# Patient Record
Sex: MALE | Race: WHITE | Employment: FULL TIME | URBAN - METROPOLITAN AREA
[De-identification: names, ages, dates, MRNs, and addresses within clinical notes are randomized per-mention and may not be internally consistent; named-entity substitution may affect disease eponyms.]

---

## 2017-02-14 RX ORDER — ATORVASTATIN CALCIUM 80 MG/1
TABLET, FILM COATED ORAL
Qty: 30 TAB | Refills: 4 | Status: SHIPPED | OUTPATIENT
Start: 2017-02-14 | End: 2017-07-16 | Stop reason: SDUPTHER

## 2017-03-06 RX ORDER — METOPROLOL TARTRATE 50 MG/1
50 TABLET ORAL 2 TIMES DAILY
Qty: 120 TAB | Refills: 4 | Status: SHIPPED | OUTPATIENT
Start: 2017-03-06 | End: 2017-07-10 | Stop reason: DRUGHIGH

## 2017-04-17 DIAGNOSIS — I48.19 PERSISTENT ATRIAL FIBRILLATION (HCC): ICD-10-CM

## 2017-07-08 ENCOUNTER — APPOINTMENT (OUTPATIENT)
Dept: GENERAL RADIOLOGY | Age: 72
End: 2017-07-08
Attending: EMERGENCY MEDICINE
Payer: COMMERCIAL

## 2017-07-08 ENCOUNTER — HOSPITAL ENCOUNTER (EMERGENCY)
Age: 72
Discharge: HOME | End: 2017-07-08
Attending: EMERGENCY MEDICINE
Payer: COMMERCIAL

## 2017-07-08 VITALS
TEMPERATURE: 97.6 F | DIASTOLIC BLOOD PRESSURE: 102 MMHG | RESPIRATION RATE: 12 BRPM | OXYGEN SATURATION: 95 % | HEART RATE: 63 BPM | SYSTOLIC BLOOD PRESSURE: 185 MMHG | BODY MASS INDEX: 34.1 KG/M2 | HEIGHT: 76 IN | WEIGHT: 280 LBS

## 2017-07-08 DIAGNOSIS — I10 ESSENTIAL HYPERTENSION: ICD-10-CM

## 2017-07-08 DIAGNOSIS — R07.9 ACUTE CHEST PAIN: Primary | ICD-10-CM

## 2017-07-08 LAB
ANION GAP BLD CALC-SCNC: 7 MMOL/L (ref 5–15)
APTT PPP: 27.8 SEC (ref 22.1–32.5)
BASOPHILS # BLD AUTO: 0.1 K/UL (ref 0–0.1)
BASOPHILS # BLD: 2 % (ref 0–1)
BNP SERPL-MCNC: 912 PG/ML (ref 0–125)
BUN SERPL-MCNC: 14 MG/DL (ref 6–20)
BUN/CREAT SERPL: 15 (ref 12–20)
CALCIUM SERPL-MCNC: 9.6 MG/DL (ref 8.5–10.1)
CHLORIDE SERPL-SCNC: 102 MMOL/L (ref 97–108)
CO2 SERPL-SCNC: 28 MMOL/L (ref 21–32)
CREAT SERPL-MCNC: 0.95 MG/DL (ref 0.7–1.3)
EOSINOPHIL # BLD: 0.3 K/UL (ref 0–0.4)
EOSINOPHIL NFR BLD: 8 % (ref 0–7)
ERYTHROCYTE [DISTWIDTH] IN BLOOD BY AUTOMATED COUNT: 13.4 % (ref 11.5–14.5)
GLUCOSE SERPL-MCNC: 100 MG/DL (ref 65–100)
HCT VFR BLD AUTO: 44.9 % (ref 36.6–50.3)
HGB BLD-MCNC: 15.1 G/DL (ref 12.1–17)
INR PPP: 1.1 (ref 0.9–1.1)
LYMPHOCYTES # BLD AUTO: 31 % (ref 12–49)
LYMPHOCYTES # BLD: 1.3 K/UL (ref 0.8–3.5)
MCH RBC QN AUTO: 31.7 PG (ref 26–34)
MCHC RBC AUTO-ENTMCNC: 33.6 G/DL (ref 30–36.5)
MCV RBC AUTO: 94.1 FL (ref 80–99)
MONOCYTES # BLD: 0.7 K/UL (ref 0–1)
MONOCYTES NFR BLD AUTO: 18 % (ref 5–13)
NEUTS SEG # BLD: 1.6 K/UL (ref 1.8–8)
NEUTS SEG NFR BLD AUTO: 41 % (ref 32–75)
PLATELET # BLD AUTO: 144 K/UL (ref 150–400)
POTASSIUM SERPL-SCNC: 4.6 MMOL/L (ref 3.5–5.1)
PROTHROMBIN TIME: 11.5 SEC (ref 9–11.1)
RBC # BLD AUTO: 4.77 M/UL (ref 4.1–5.7)
SODIUM SERPL-SCNC: 137 MMOL/L (ref 136–145)
THERAPEUTIC RANGE,PTTT: NORMAL SECS (ref 58–77)
TROPONIN I SERPL-MCNC: <0.04 NG/ML
WBC # BLD AUTO: 4 K/UL (ref 4.1–11.1)

## 2017-07-08 PROCEDURE — 85610 PROTHROMBIN TIME: CPT | Performed by: EMERGENCY MEDICINE

## 2017-07-08 PROCEDURE — 85730 THROMBOPLASTIN TIME PARTIAL: CPT | Performed by: EMERGENCY MEDICINE

## 2017-07-08 PROCEDURE — 80048 BASIC METABOLIC PNL TOTAL CA: CPT | Performed by: EMERGENCY MEDICINE

## 2017-07-08 PROCEDURE — 84484 ASSAY OF TROPONIN QUANT: CPT | Performed by: EMERGENCY MEDICINE

## 2017-07-08 PROCEDURE — 36415 COLL VENOUS BLD VENIPUNCTURE: CPT | Performed by: EMERGENCY MEDICINE

## 2017-07-08 PROCEDURE — 93005 ELECTROCARDIOGRAM TRACING: CPT

## 2017-07-08 PROCEDURE — 99285 EMERGENCY DEPT VISIT HI MDM: CPT

## 2017-07-08 PROCEDURE — 71020 XR CHEST PA LAT: CPT

## 2017-07-08 PROCEDURE — 83880 ASSAY OF NATRIURETIC PEPTIDE: CPT | Performed by: EMERGENCY MEDICINE

## 2017-07-08 PROCEDURE — 85025 COMPLETE CBC W/AUTO DIFF WBC: CPT | Performed by: EMERGENCY MEDICINE

## 2017-07-08 RX ORDER — SODIUM CHLORIDE 0.9 % (FLUSH) 0.9 %
5-10 SYRINGE (ML) INJECTION AS NEEDED
Status: DISCONTINUED | OUTPATIENT
Start: 2017-07-08 | End: 2017-07-08 | Stop reason: HOSPADM

## 2017-07-08 RX ORDER — ISOSORBIDE MONONITRATE 30 MG/1
30 TABLET, EXTENDED RELEASE ORAL DAILY
Qty: 20 TAB | Refills: 0 | Status: ON HOLD | OUTPATIENT
Start: 2017-07-08 | End: 2017-07-13

## 2017-07-08 NOTE — ED TRIAGE NOTES
Pt to ER for HTN. Pt reports his Metoprolol was doubled a little over a week ago by Pancho Kasper. Pt also reports WATERS and increasing chest tightness with exertion.

## 2017-07-08 NOTE — ED PROVIDER NOTES
HPI Comments: 73 y.o. male with past medical history significant for MI, CAD, cardiac stent placement, HTN, COPD, A-fib, ETOH abuse, back surgery, obesity who presents with chief complaint of SOB. Patient presents in ED complaining of increasing SOB and associated chest tightness over the past ~1 month. He explains that he becomes \"out of breath\" with physical activity and notes his b/l ankles seem to be swollen. Patient reports hx of similar sx associated with cardiac problem and notes \"this feels like I have a blockage\". He reports hx of cardiac stent placement. Patient also complains of ~3 weeks elevated BP. He explains that systolic has been \"in 147'K\" and diastolic has been \"above 100\". Patient was seen by PCP for this ~9-10 days ago where he had his Metoprolol dosage changed from 50 mg BID to 100 mg BID. He reports taking this medications as directed with no improvement in BP. In ED, patient is in A-fib. He reports hx of intermittent A-fib since MI ~3 years ago but explains that \"I am not always in A-fib\". Patient reports taking Eliquis. He denies any chest pain, fever, cough, nausea, vomiting, numbness, weakness. There are no other acute medical concerns at this time. Social hx: former smoker; quit date: 2/1/2014. +ETOH use; 1 glass of wine per week. PCP: Jarrell Roy MD    Cardiology: Dian Maldonado MD    Note written by Low Machuca. Kirit Morley, as dictated by Claudia Ferrera MD 7:57 AM      The history is provided by the patient. No  was used. Past Medical History:   Diagnosis Date    Alcohol abuse     Atrial fibrillation ([facility] [address] 6/30/2014    Chronic obstructive pulmonary disease (HCC)     Coronary artery disease 4/11/2014    A.  NSTEMI (2/12/14): B. Cath (2/12/14):  LAD m50. OM1 50. RCA m99. EF 55%. C.  PCI mRCA (2/12/14):  3.5x26 Integrity ([facility]).     Depression     Dyslipidemia 4/11/2014    Hypertension 4/11/2014    Obesity        Past Surgical History: Procedure Laterality Date    HX HEART CATHETERIZATION  2/2014    PCI    HX ORTHOPAEDIC      knee    HX ORTHOPAEDIC      back          Family History:   Problem Relation Age of Onset    No Known Problems Mother        Social History     Social History    Marital status:      Spouse name: N/A    Number of children: N/A    Years of education: N/A     Occupational History    Not on file. Social History Main Topics    Smoking status: Former Smoker     Quit date: 2/1/2014    Smokeless tobacco: Current User      Comment: Smokes e cigartettes    Alcohol use 0.6 oz/week     1 Glasses of wine per week    Drug use: No    Sexual activity: Not on file     Other Topics Concern    Not on file     Social History Narrative         ALLERGIES: Review of patient's allergies indicates no known allergies. Review of Systems   Constitutional: Negative. Negative for appetite change, fever and unexpected weight change. HENT: Negative. Negative for ear pain, hearing loss, nosebleeds, rhinorrhea, sore throat and trouble swallowing. Respiratory: Positive for chest tightness and shortness of breath. Negative for cough. Cardiovascular: Positive for leg swelling (b/l ankles). Negative for chest pain and palpitations. Gastrointestinal: Negative. Negative for abdominal distention, abdominal pain, blood in stool and vomiting. Endocrine: Negative. Genitourinary: Negative for dysuria and hematuria. Musculoskeletal: Negative. Negative for back pain and myalgias. Skin: Negative. Negative for rash. Allergic/Immunologic: Negative. Neurological: Negative. Negative for dizziness, syncope, weakness and numbness. Hematological: Negative. Psychiatric/Behavioral: Negative. All other systems reviewed and are negative.       Vitals:    07/08/17 0745 07/08/17 0803 07/08/17 0815 07/08/17 0830   BP: (!) 155/109 (!) 141/108 (!) 142/102 (!) 172/102   Pulse: 61 61 64 61   Resp: 11 16 13 8   Temp: SpO2: 96% 94% 100% 95%   Weight:       Height:                Physical Exam   Constitutional: He is oriented to person, place, and time. He appears well-developed and well-nourished. No distress. HENT:   Head: Normocephalic and atraumatic. Right Ear: External ear normal.   Left Ear: External ear normal.   Nose: Nose normal.   Mouth/Throat: Oropharynx is clear and moist.   Eyes: Conjunctivae and EOM are normal. Pupils are equal, round, and reactive to light. Neck: Normal range of motion. Neck supple. No JVD present. No thyromegaly present. Cardiovascular: Normal rate, normal heart sounds and intact distal pulses. An irregular rhythm present. No murmur heard. Pulmonary/Chest: Effort normal and breath sounds normal. No respiratory distress. He has no wheezes. He has no rales. Abdominal: Soft. Bowel sounds are normal. He exhibits no distension. There is no tenderness. Musculoskeletal: Normal range of motion. He exhibits no edema. Neurological: He is alert and oriented to person, place, and time. No cranial nerve deficit. Skin: Skin is warm and dry. No rash noted. Psychiatric: He has a normal mood and affect. His behavior is normal. Thought content normal.   Note written by Pascual Martínez. Elen Henry, as dictated by Kevin Segal MD 7:57 AM      [facility]  ED Course       Procedures    ED EKG interpretation:  Rhythm: atrial fib; and irregular. Rate (approx.): 62; Axis: normal; ST/T wave: normal; no acute injury pattern. Note written by Pascual Martínez. Elen Henry, as dictated by Kevin Segal MD 7:38 AM    CONSULT NOTE:  Fiona Hope MD spoke with Dr. Megha Mendenhall, Consult for Cardiology. Discussed available diagnostic tests and clinical findings. He is in agreement with care plans as outlined. Dr. Megha Mendenhall will follow up with patient on Monday or Tuesday. PROGRESS NOTE:  9:20 AM  Patient has BNP of 912. CBC unrem. CMP unrem. Troponin neg. Chest x-ray neg.  Discussed with Dr. Megha Mendenhall about accelerated sx over past few weeks. ED work up is negative but patient needs urgent Cardiology f/u. Will start patient on long acting nitrates to control any element of angina. Cardiology will follow up on Monday or Tuesday. Patient encouraged to return if sx worsen.

## 2017-07-08 NOTE — DISCHARGE INSTRUCTIONS
Chest Pain: Care Instructions  Your Care Instructions  There are many things that can cause chest pain. Some are not serious and will get better on their own in a few days. But some kinds of chest pain need more testing and treatment. Your doctor may have recommended a follow-up visit in the next 8 to 12 hours. If you are not getting better, you may need more tests or treatment. Even though your doctor has released you, you still need to watch for any problems. The doctor carefully checked you, but sometimes problems can develop later. If you have new symptoms or if your symptoms do not get better, get medical care right away. If you have worse or different chest pain or pressure that lasts more than 5 minutes or you passed out (lost consciousness), call 911 or seek other emergency help right away. A medical visit is only one step in your treatment. Even if you feel better, you still need to do what your doctor recommends, such as going to all suggested follow-up appointments and taking medicines exactly as directed. This will help you recover and help prevent future problems. How can you care for yourself at home? · Rest until you feel better. · Take your medicine exactly as prescribed. Call your doctor if you think you are having a problem with your medicine. · Do not drive after taking a prescription pain medicine. When should you call for help? Call 911 if:  · You passed out (lost consciousness). · You have severe difficulty breathing. · You have symptoms of a heart attack. These may include:  ¨ Chest pain or pressure, or a strange feeling in your chest.  ¨ Sweating. ¨ Shortness of breath. ¨ Nausea or vomiting. ¨ Pain, pressure, or a strange feeling in your back, neck, jaw, or upper belly or in one or both shoulders or arms. ¨ Lightheadedness or sudden weakness. ¨ A fast or irregular heartbeat.   After you call 911, the  may tell you to chew 1 adult-strength or 2 to 4 low-dose aspirin. Wait for an ambulance. Do not try to drive yourself. Call your doctor today if:  · You have any trouble breathing. · Your chest pain gets worse. · You are dizzy or lightheaded, or you feel like you may faint. · You are not getting better as expected. · You are having new or different chest pain. Where can you learn more? Go to http://jose manuel-devan.info/. Enter A120 in the search box to learn more about \"Chest Pain: Care Instructions. \"  Current as of: March 20, 2017  Content Version: 11.3  © 1418-6987 [employer]. Care instructions adapted under license by [id number] (which disclaims liability or warranty for this information). If you have questions about a medical condition or this instruction, always ask your healthcare professional. [address] any warranty or liability for your use of this information. High Blood Pressure: Care Instructions  Your Care Instructions  If your blood pressure is usually above 140/90, you have high blood pressure, or hypertension. That means the top number is 140 or higher or the bottom number is 90 or higher, or both. Despite what a lot of people think, high blood pressure usually doesn't cause headaches or make you feel dizzy or lightheaded. It usually has no symptoms. But it does increase your risk for heart attack, stroke, and kidney or eye damage. The higher your blood pressure, the more your risk increases. Your doctor will give you a goal for your blood pressure. Your goal will be based on your health and your age. An example of a goal is to keep your blood pressure below 140/90. Lifestyle changes, such as eating healthy and being active, are always important to help lower blood pressure. You might also take medicine to reach your blood pressure goal.  Follow-up care is a key part of your treatment and safety.  Be sure to make and go to all appointments, and call your doctor if you are having problems. It's also a good idea to know your test results and keep a list of the medicines you take. How can you care for yourself at home? Medical treatment  · If you stop taking your medicine, your blood pressure will go back up. You may take one or more types of medicine to lower your blood pressure. Be safe with medicines. Take your medicine exactly as prescribed. Call your doctor if you think you are having a problem with your medicine. · Talk to your doctor before you start taking aspirin every day. Aspirin can help certain people lower their risk of a heart attack or stroke. But taking aspirin isn't right for everyone, because it can cause serious bleeding. · See your doctor regularly. You may need to see the doctor more often at first or until your blood pressure comes down. · If you are taking blood pressure medicine, talk to your doctor before you take decongestants or anti-inflammatory medicine, such as ibuprofen. Some of these medicines can raise blood pressure. · Learn how to check your blood pressure at home. Lifestyle changes  · Stay at a healthy weight. This is especially important if you put on weight around the waist. Losing even 10 pounds can help you lower your blood pressure. · If your doctor recommends it, get more exercise. Walking is a good choice. Bit by bit, increase the amount you walk every day. Try for at least 30 minutes on most days of the week. You also may want to swim, bike, or do other activities. · Avoid or limit alcohol. Talk to your doctor about whether you can drink any alcohol. · Try to limit how much sodium you eat to less than 2,300 milligrams (mg) a day. Your doctor may ask you to try to eat less than 1,500 mg a day. · Eat plenty of fruits (such as bananas and oranges), vegetables, legumes, whole grains, and low-fat dairy products. · Lower the amount of saturated fat in your diet. Saturated fat is found in animal products such as milk, cheese, and meat. Limiting these foods may help you lose weight and also lower your risk for heart disease. · Do not smoke. Smoking increases your risk for heart attack and stroke. If you need help quitting, talk to your doctor about stop-smoking programs and medicines. These can increase your chances of quitting for good. When should you call for help? Call 911 anytime you think you may need emergency care. This may mean having symptoms that suggest that your blood pressure is causing a serious heart or blood vessel problem. Your blood pressure may be over 180/110. For example, call 911 if:  · You have symptoms of a heart attack. These may include:  ¨ Chest pain or pressure, or a strange feeling in the chest.  ¨ Sweating. ¨ Shortness of breath. ¨ Nausea or vomiting. ¨ Pain, pressure, or a strange feeling in the back, neck, jaw, or upper belly or in one or both shoulders or arms. ¨ Lightheadedness or sudden weakness. ¨ A fast or irregular heartbeat. · You have symptoms of a stroke. These may include:  ¨ Sudden numbness, tingling, weakness, or loss of movement in your face, arm, or leg, especially on only one side of your body. ¨ Sudden vision changes. ¨ Sudden trouble speaking. ¨ Sudden confusion or trouble understanding simple statements. ¨ Sudden problems with walking or balance. ¨ A sudden, severe headache that is different from past headaches. · You have severe back or belly pain. Do not wait until your blood pressure comes down on its own. Get help right away. Call your doctor now or seek immediate care if:  · Your blood pressure is much higher than normal (such as 180/110 or higher), but you don't have symptoms. · You think high blood pressure is causing symptoms, such as:  ¨ Severe headache. ¨ Blurry vision. Watch closely for changes in your health, and be sure to contact your doctor if:  · Your blood pressure measures 140/90 or higher at least 2 times.  That means the top number is 140 or higher or the bottom number is 90 or higher, or both. · You think you may be having side effects from your blood pressure medicine. · Your blood pressure is usually normal, but it goes above normal at least 2 times. Where can you learn more? Go to http://jose manuel-devan.info/. Enter S977 in the search box to learn more about \"High Blood Pressure: Care Instructions. \"  Current as of: August 8, 2016  Content Version: 11.3  © 0431-5173 [employer]. Care instructions adapted under license by [employer] (which disclaims liability or warranty for this information). If you have questions about a medical condition or this instruction, always ask your healthcare professional. [address] any warranty or liability for your use of this information. We hope that we have addressed all of your medical concerns. The examination and treatment you received in the Emergency Department were for an emergent problem and were not intended as complete care. It is important that you follow up with your healthcare provider(s) for ongoing care. If your symptoms worsen or do not improve as expected, and you are unable to reach your usual health care provider(s), you should return to the Emergency Department. Today's healthcare is undergoing tremendous change, and patient satisfaction surveys are one of the many tools to assess the quality of medical care. You may receive a survey from the [employer] organization regarding your experience in the Emergency Department. I hope that your experience has been completely positive, particularly the medical care that I provided. As such, please participate in the survey; anything less than excellent does not meet my expectations or intentions. [facility] and [facility] participate in nationally recognized quality of care measures.   If your blood pressure is greater than 120/80, as reported below, we urge that you seek medical care to address the potential of high blood pressure, commonly known as hypertension. Hypertension can be hereditary or can be caused by certain medical conditions, pain, stress, or \"white coat syndrome. \"       Please make an appointment with your health care provider(s) for follow up of your Emergency Department visit. VITALS:   Patient Vitals for the past 8 hrs:   Temp Pulse Resp BP SpO2   07/08/17 0830 - 61 8 (!) 172/102 95 %   07/08/17 0815 - 64 13 (!) 142/102 100 %   07/08/17 0803 - 61 16 (!) 141/108 94 %   07/08/17 0745 - 61 11 (!) 155/109 96 %   07/08/17 0732 97.6 °F (36.4 °C) 76 18 (!) 181/129 97 %          Thank you for allowing us to provide you with medical care today. We realize that you have many choices for your emergency care needs. Please choose us in the future for any continued health care needs. Regards,           Alistair East, [address] [place] [address].   Office: [phone]            Recent Results (from the past 24 hour(s))   CBC WITH AUTOMATED DIFF    Collection Time: 07/08/17  7:49 AM   Result Value Ref Range    WBC 4.0 (L) 4.1 - 11.1 K/uL    RBC 4.77 4.10 - 5.70 M/uL    HGB 15.1 12.1 - 17.0 g/dL    HCT 44.9 36.6 - 50.3 %    MCV 94.1 80.0 - 99.0 FL    MCH 31.7 26.0 - 34.0 PG    MCHC 33.6 30.0 - 36.5 g/dL    RDW 13.4 11.5 - 14.5 %    PLATELET 688 (L) 339 - 400 K/uL    NEUTROPHILS 41 32 - 75 %    LYMPHOCYTES 31 12 - 49 %    MONOCYTES 18 (H) 5 - 13 %    EOSINOPHILS 8 (H) 0 - 7 %    BASOPHILS 2 (H) 0 - 1 %    ABS. NEUTROPHILS 1.6 (L) 1.8 - 8.0 K/UL    ABS. LYMPHOCYTES 1.3 0.8 - 3.5 K/UL    ABS. MONOCYTES 0.7 0.0 - 1.0 K/UL    ABS. EOSINOPHILS 0.3 0.0 - 0.4 K/UL    ABS.  BASOPHILS 0.1 0.0 - 0.1 K/UL   METABOLIC PANEL, BASIC    Collection Time: 07/08/17  7:49 AM   Result Value Ref Range    Sodium 137 136 - 145 mmol/L    Potassium 4.6 3.5 - 5.1 mmol/L    Chloride 102 97 - 108 mmol/L    CO2 28 21 - 32 mmol/L    Anion gap 7 5 - 15 mmol/L    Glucose 100 65 - 100 mg/dL    BUN 14 6 - 20 MG/DL    Creatinine 0.95 0.70 - 1.30 MG/DL    BUN/Creatinine ratio 15 12 - 20      GFR est AA >60 >60 ml/min/1.73m2    GFR est non-AA >60 >60 ml/min/1.73m2    Calcium 9.6 8.5 - 10.1 MG/DL   TROPONIN I    Collection Time: 07/08/17  7:49 AM   Result Value Ref Range    Troponin-I, Qt. <0.04 <0.05 ng/mL   PRO-BNP    Collection Time: 07/08/17  7:52 AM   Result Value Ref Range    NT pro- (H) 0 - 125 PG/ML   PROTHROMBIN TIME + INR    Collection Time: 07/08/17  8:20 AM   Result Value Ref Range    INR 1.1 0.9 - 1.1      Prothrombin time 11.5 (H) 9.0 - 11.1 sec   PTT    Collection Time: 07/08/17  8:20 AM   Result Value Ref Range    aPTT 27.8 22.1 - 32.5 sec    aPTT, therapeutic range     58.0 - 77.0 SECS       Xr Chest Pa Lat    Result Date: 7/8/2017  CLINICAL HISTORY: Chest pain INDICATION: Chest pain COMPARISON: 9/17/2015 FINDINGS: PA and lateral views of the chest are obtained. The cardiopericardial silhouette is within normal limits. There is no pleural effusion, pneumothorax or focal consolidation present. Patient is on a cardiac monitor. IMPRESSION: No acute intrathoracic disease.

## 2017-07-09 DIAGNOSIS — I48.19 PERSISTENT ATRIAL FIBRILLATION (HCC): ICD-10-CM

## 2017-07-09 RX ORDER — APIXABAN 5 MG/1
TABLET, FILM COATED ORAL
Qty: 60 TAB | Refills: 2 | Status: SHIPPED | OUTPATIENT
Start: 2017-07-09 | End: 2017-09-30 | Stop reason: SDUPTHER

## 2017-07-10 ENCOUNTER — CLINICAL SUPPORT (OUTPATIENT)
Dept: CARDIOLOGY CLINIC | Age: 72
End: 2017-07-10

## 2017-07-10 VITALS
DIASTOLIC BLOOD PRESSURE: 100 MMHG | HEART RATE: 60 BPM | SYSTOLIC BLOOD PRESSURE: 150 MMHG | HEIGHT: 75 IN | OXYGEN SATURATION: 97 % | WEIGHT: 280 LBS | BODY MASS INDEX: 34.82 KG/M2

## 2017-07-10 DIAGNOSIS — I10 ESSENTIAL HYPERTENSION: Primary | ICD-10-CM

## 2017-07-10 LAB
ATRIAL RATE: 66 BPM
CALCULATED R AXIS, ECG10: 10 DEGREES
CALCULATED T AXIS, ECG11: 44 DEGREES
DIAGNOSIS, 93000: NORMAL
Q-T INTERVAL, ECG07: 410 MS
QRS DURATION, ECG06: 92 MS
QTC CALCULATION (BEZET), ECG08: 416 MS
VENTRICULAR RATE, ECG03: 62 BPM

## 2017-07-10 RX ORDER — METOPROLOL TARTRATE 50 MG/1
50 TABLET ORAL 4 TIMES DAILY
COMMUNITY
End: 2017-07-11 | Stop reason: DRUGHIGH

## 2017-07-10 NOTE — PROGRESS NOTES
Pt seen at pcp and sent to er on Saturday due to elevated bp Per pt-bp has continued to be elevated. Increase in metoprolol to 50 mg 4xs daily on Friday. Pt states the increase has not really helped. Pt also with C/o sob,chest tightness,some fatigue. History of Afib Pt states\"I think I have a blockage \" Advised of upcoming apt at 12:40pm tomorrow. Advised pt that if symptoms get worse or ant other concerns to go to the er. Continue with increased metoprolol until seen tomorrow. Pt given letter for work advising appointment tomorrow.

## 2017-07-11 ENCOUNTER — OFFICE VISIT (OUTPATIENT)
Dept: CARDIOLOGY CLINIC | Age: 72
End: 2017-07-11

## 2017-07-11 ENCOUNTER — DOCUMENTATION ONLY (OUTPATIENT)
Dept: CARDIOLOGY CLINIC | Age: 72
End: 2017-07-11

## 2017-07-11 VITALS
OXYGEN SATURATION: 98 % | DIASTOLIC BLOOD PRESSURE: 98 MMHG | RESPIRATION RATE: 22 BRPM | SYSTOLIC BLOOD PRESSURE: 150 MMHG | HEART RATE: 66 BPM | BODY MASS INDEX: 34.82 KG/M2 | WEIGHT: 280 LBS | HEIGHT: 75 IN

## 2017-07-11 DIAGNOSIS — I10 ESSENTIAL HYPERTENSION: ICD-10-CM

## 2017-07-11 DIAGNOSIS — I48.19 PERSISTENT ATRIAL FIBRILLATION (HCC): ICD-10-CM

## 2017-07-11 DIAGNOSIS — I25.83 CORONARY ARTERY DISEASE DUE TO LIPID RICH PLAQUE: Primary | ICD-10-CM

## 2017-07-11 DIAGNOSIS — E78.5 DYSLIPIDEMIA: ICD-10-CM

## 2017-07-11 DIAGNOSIS — I25.10 CORONARY ARTERY DISEASE DUE TO LIPID RICH PLAQUE: Primary | ICD-10-CM

## 2017-07-11 RX ORDER — AMLODIPINE BESYLATE 5 MG/1
5 TABLET ORAL DAILY
Qty: 30 TAB | Refills: 5 | Status: ON HOLD | OUTPATIENT
Start: 2017-07-11 | End: 2017-07-13

## 2017-07-11 RX ORDER — AMLODIPINE BESYLATE 5 MG/1
5 TABLET ORAL DAILY
COMMUNITY
End: 2017-07-11 | Stop reason: SDUPTHER

## 2017-07-11 RX ORDER — METOPROLOL TARTRATE 100 MG/1
100 TABLET ORAL 2 TIMES DAILY
Qty: 60 TAB | Refills: 3 | Status: SHIPPED | OUTPATIENT
Start: 2017-07-11 | End: 2017-10-28 | Stop reason: SDUPTHER

## 2017-07-11 RX ORDER — METOPROLOL TARTRATE 100 MG/1
TABLET ORAL 2 TIMES DAILY
COMMUNITY
End: 2017-07-11 | Stop reason: SDUPTHER

## 2017-07-11 NOTE — MR AVS SNAPSHOT
Visit Information Date & Time Provider Department Dept. Phone Encounter #  
 7/11/2017 12:40 PM Denis Brush MD [facility] Althea Kurtz [phone] [id number] Follow-up Instructions Return for arrange. Upcoming Health Maintenance Date Due Hepatitis C Screening 1945 FOBT Q 1 YEAR AGE 50-75 12/7/1997 ZOSTER VACCINE AGE 60> 12/7/2007 GLAUCOMA SCREENING Q2Y 12/7/2012 Pneumococcal 65+ Low/Medium Risk (1 of 2 - PCV13) 12/7/2012 MEDICARE YEARLY EXAM 12/7/2012 INFLUENZA AGE 9 TO ADULT 8/1/2017 DTaP/Tdap/Td series (2 - Td) 5/12/2025 Allergies as of 7/11/2017  Review Complete On: 7/11/2017 By: Denis Brush MD  
 No Known Allergies Current Immunizations  Reviewed on 1/22/2015 Name Date Tdap 5/12/2015  9:58 PM  
  
 Not reviewed this visit You Were Diagnosed With   
  
 Codes Comments Coronary artery disease due to lipid rich plaque    -  Primary ICD-10-CM: I25.10, I25.83 ICD-9-CM: 414.00, 414.3 Persistent atrial fibrillation (HCC)     ICD-10-CM: I48.1 ICD-9-CM: 427.31 Essential hypertension     ICD-10-CM: I10 
ICD-9-CM: 401.9 Dyslipidemia     ICD-10-CM: E78.5 ICD-9-CM: 272.4 Vitals BP Pulse Resp Height(growth percentile) Weight(growth percentile) SpO2  
 (!) 150/98 (BP 1 Location: Left arm, BP Patient Position: Sitting) 66 22 6' 3\" (1.905 m) 280 lb (127 kg) 98% BMI Smoking Status 28 kg/m2 Former Smoker Vitals History BMI and BSA Data Body Mass Index Body Surface Area 35 kg/m 2 2.59 m 2 [facility]erred [facility] [address] [phone] Your Updated Medication List  
  
   
This list is accurate as of: 7/11/17 12:55 PM.  Always use your most recent med list. amLODIPine 5 mg tablet Commonly known as:  OceanTailer Bars Take 1 Tab by mouth daily. aspirin 81 mg chewable tablet Take 1 Tab by mouth daily. atorvastatin 80 mg tablet Commonly known as:  LIPITOR  
take 1 tablet by mouth once daily  
  
 citalopram 20 mg tablet Commonly known as:  Lawrence Hopes Take 40 mg by mouth daily. ELIQUIS 5 mg tablet Generic drug:  apixaban  
take 1 tablet by mouth twice a day FISH -1,000 mg capsule Generic drug:  fish oil-omega-3 fatty acids Take 1 Cap by mouth every evening. isosorbide mononitrate ER 30 mg tablet Commonly known as:  IMDUR Take 1 Tab by mouth daily. metoprolol tartrate 100 mg IR tablet Commonly known as:  LOPRESSOR Take 1 Tab by mouth two (2) times a day. miSOPROStol 200 mcg tablet Commonly known as:  CYTOTEC Take 1 Tab by mouth every six (6) hours. Indications: PREVENTION OF NSAID-INDUCED GASTRIC ULCER  
  
 thiamine 100 mg tablet Commonly known as:  B-1 Take 100 mg by mouth daily. We Performed the Following CBC W/O DIFF [88402 CPT(R)] METABOLIC PANEL, BASIC [40654 CPT(R)] Follow-up Instructions Return for arrange. Patient Instructions Dibspace Activation Thank you for requesting access to Dibspace. Please follow the instructions below to securely access and download your online medical record. Dibspace allows you to send messages to your doctor, view your test results, renew your prescriptions, schedule appointments, and more. How Do I Sign Up? 1. In your internet browser, go to [email or web address] 
2. Click on the First Time User? Click Here link in the Sign In box. You will be redirect to the New Member Sign Up page. 3. Enter your Dibspace Access Code exactly as it appears below. You will not need to use this code after youve completed the sign-up process. If you do not sign up before the expiration date, you must request a new code.  
 
Dibspace Access Code: [email or web address] 
 Expires: 10/6/2017  9:54 AM (This is the date your SeaDragon Software access code will ) 4. Enter the last four digits of your Social Security Number (xxxx) and Date of Birth (mm/dd/yyyy) as indicated and click Submit. You will be taken to the next sign-up page. 5. Create a SeaDragon Software ID. This will be your SeaDragon Software login ID and cannot be changed, so think of one that is secure and easy to remember. 6. Create a SeaDragon Software password. You can change your password at any time. 7. Enter your Password Reset Question and Answer. This can be used at a later time if you forget your password. 8. Enter your e-mail address. You will receive e-mail notification when new information is available in [address] 9. Click Sign Up. You can now view and download portions of your medical record. 10. Click the Download Summary menu link to download a portable copy of your medical information. Additional Information If you have questions, please visit the Frequently Asked Questions section of the SeaDragon Software website at [email or web address] Remember, SeaDragon Software is NOT to be used for urgent needs. For medical emergencies, dial 911. Atrial Fibrillation: Care Instructions Your Care Instructions Atrial fibrillation is an irregular and often fast heartbeat. Treating this condition is important for several reasons. It can cause blood clots, which can travel from your heart to your brain and cause a stroke. If you have a fast heartbeat, you may feel lightheaded, dizzy, and weak. An irregular heartbeat can also increase your risk for heart failure. Atrial fibrillation is often the result of another heart condition, such as high blood pressure or coronary artery disease. Making changes to improve your heart condition will help you stay healthy and active. Follow-up care is a key part of your treatment and safety.  Be sure to make and go to all appointments, and call your doctor if you are having problems. It's also a good idea to know your test results and keep a list of the medicines you take. How can you care for yourself at home? Medicines · Take your medicines exactly as prescribed. Call your doctor if you think you are having a problem with your medicine. You will get more details on the specific medicines your doctor prescribes. · If your doctor has given you a blood thinner to prevent a stroke, be sure you get instructions about how to take your medicine safely. Blood thinners can cause serious bleeding problems. · Do not take any vitamins, over-the-counter drugs, or herbal products without talking to your doctor first. 
Lifestyle changes · Do not smoke. Smoking can increase your chance of a stroke and heart attack. If you need help quitting, talk to your doctor about stop-smoking programs and medicines. These can increase your chances of quitting for good. · Eat a heart-healthy diet. · Stay at a healthy weight. Lose weight if you need to. · Limit alcohol to 2 drinks a day for men and 1 drink a day for women. Too much alcohol can cause health problems. · Avoid colds and flu. Get a pneumococcal vaccine shot. If you have had one before, ask your doctor whether you need another dose. Get a flu shot every year. If you must be around people with colds or flu, wash your hands often. Activity · If your doctor recommends it, get more exercise. Walking is a good choice. Bit by bit, increase the amount you walk every day. Try for at least 30 minutes on most days of the week. You also may want to swim, bike, or do other activities. Your doctor may suggest that you join a cardiac rehabilitation program so that you can have help increasing your physical activity safely. · Start light exercise if your doctor says it is okay. Even a small amount will help you get stronger, have more energy, and manage stress. Walking is an easy way to get exercise.  Start out by walking a little more than you did in the hospital. Gradually increase the amount you walk. · When you exercise, watch for signs that your heart is working too hard. You are pushing too hard if you cannot talk while you are exercising. If you become short of breath or dizzy or have chest pain, sit down and rest immediately. · Check your pulse regularly. Place two fingers on the artery at the palm side of your wrist, in line with your thumb. If your heartbeat seems uneven or fast, talk to your doctor. When should you call for help? Call 911 anytime you think you may need emergency care. For example, call if: 
· You have symptoms of a heart attack. These may include: ¨ Chest pain or pressure, or a strange feeling in the chest. 
¨ Sweating. ¨ Shortness of breath. ¨ Nausea or vomiting. ¨ Pain, pressure, or a strange feeling in the back, neck, jaw, or upper belly or in one or both shoulders or arms. ¨ Lightheadedness or sudden weakness. ¨ A fast or irregular heartbeat. After you call 911, the  may tell you to chew 1 adult-strength or 2 to 4 low-dose aspirin. Wait for an ambulance. Do not try to drive yourself. · You have symptoms of a stroke. These may include: 
¨ Sudden numbness, tingling, weakness, or loss of movement in your face, arm, or leg, especially on only one side of your body. ¨ Sudden vision changes. ¨ Sudden trouble speaking. ¨ Sudden confusion or trouble understanding simple statements. ¨ Sudden problems with walking or balance. ¨ A sudden, severe headache that is different from past headaches. · You passed out (lost consciousness). Call your doctor now or seek immediate medical care if: 
· You have new or increased shortness of breath. · You feel dizzy or lightheaded, or you feel like you may faint. · Your heart rate becomes irregular. · You can feel your heart flutter in your chest or skip heartbeats. Tell your doctor if these symptoms are new or worse. Watch closely for changes in your health, and be sure to contact your doctor if you have any problems. Where can you learn more? Go to http://jose manuel-devan.info/. Enter U020 in the search box to learn more about \"Atrial Fibrillation: Care Instructions. \" Current as of: September 21, 2016 Content Version: 11.3 © 0442-6312 [employer]. Care instructions adapted under license by [employer] (which disclaims liability or warranty for this information). If you have questions about a medical condition or this instruction, always ask your healthcare professional. [address] any warranty or liability for your use of this information. Introducing [facility] [place] [facility]! [facility] introduces BlackArrow patient portal. Now you can access parts of your medical record, email your doctor's office, and request medication refills online. 1. In your internet browser, go to [email or web address] 2. Click on the First Time User? Click Here link in the Sign In box. You will see the New Member Sign Up page. 3. Enter your BlackArrow Access Code exactly as it appears below. You will not need to use this code after youve completed the sign-up process. If you do not sign up before the expiration date, you must request a new code. · BlackArrow Access Code: [email or web address] Expires: 10/6/2017  9:54 AM 
 
4. Enter the last four digits of your Social Security Number (xxxx) and Date of Birth (mm/dd/yyyy) as indicated and click Submit. You will be taken to the next sign-up page. 5. Create a [employer] Groupt ID. This will be your BlackArrow login ID and cannot be changed, so think of one that is secure and easy to remember. 6. Create a BlackArrow password. You can change your password at any time. 7. Enter your Password Reset Question and Answer. This can be used at a later time if you forget your password. 8. Enter your e-mail address. You will receive e-mail notification when new information is available in [address] 9. Click Sign Up. You can now view and download portions of your medical record. 10. Click the Download Summary menu link to download a portable copy of your medical information. If you have questions, please visit the Frequently Asked Questions section of the Ocean Aero website. Remember, Ocean Aero is NOT to be used for urgent needs. For medical emergencies, dial 911. Now available from your iPhone and Android! Please provide this summary of care documentation to your next provider. Your primary care clinician is listed as Mazin Carlos. If you have any questions after today's visit, please call [phone].

## 2017-07-11 NOTE — PATIENT INSTRUCTIONS
Salsify Activation    Thank you for requesting access to Salsify. Please follow the instructions below to securely access and download your online medical record. Salsify allows you to send messages to your doctor, view your test results, renew your prescriptions, schedule appointments, and more. How Do I Sign Up? 1. In your internet browser, go to [email or web address]  2. Click on the First Time User? Click Here link in the Sign In box. You will be redirect to the New Member Sign Up page. 3. Enter your Salsify Access Code exactly as it appears below. You will not need to use this code after youve completed the sign-up process. If you do not sign up before the expiration date, you must request a new code. Salsify Access Code: Aristeo Milian  Expires: 10/6/2017  9:54 AM (This is the date your Salsify access code will )    4. Enter the last four digits of your Social Security Number (xxxx) and Date of Birth (mm/dd/yyyy) as indicated and click Submit. You will be taken to the next sign-up page. 5. Create a Salsify ID. This will be your Salsify login ID and cannot be changed, so think of one that is secure and easy to remember. 6. Create a Salsify password. You can change your password at any time. 7. Enter your Password Reset Question and Answer. This can be used at a later time if you forget your password. 8. Enter your e-mail address. You will receive e-mail notification when new information is available in [address] 9. Click Sign Up. You can now view and download portions of your medical record. 10. Click the Download Summary menu link to download a portable copy of your medical information. Additional Information    If you have questions, please visit the Frequently Asked Questions section of the Salsify website at [email or web address] Remember, Salsify is NOT to be used for urgent needs. For medical emergencies, dial 911.            Atrial Fibrillation: Care Instructions  Your Care Instructions    Atrial fibrillation is an irregular and often fast heartbeat. Treating this condition is important for several reasons. It can cause blood clots, which can travel from your heart to your brain and cause a stroke. If you have a fast heartbeat, you may feel lightheaded, dizzy, and weak. An irregular heartbeat can also increase your risk for heart failure. Atrial fibrillation is often the result of another heart condition, such as high blood pressure or coronary artery disease. Making changes to improve your heart condition will help you stay healthy and active. Follow-up care is a key part of your treatment and safety. Be sure to make and go to all appointments, and call your doctor if you are having problems. It's also a good idea to know your test results and keep a list of the medicines you take. How can you care for yourself at home? Medicines  · Take your medicines exactly as prescribed. Call your doctor if you think you are having a problem with your medicine. You will get more details on the specific medicines your doctor prescribes. · If your doctor has given you a blood thinner to prevent a stroke, be sure you get instructions about how to take your medicine safely. Blood thinners can cause serious bleeding problems. · Do not take any vitamins, over-the-counter drugs, or herbal products without talking to your doctor first.  Lifestyle changes  · Do not smoke. Smoking can increase your chance of a stroke and heart attack. If you need help quitting, talk to your doctor about stop-smoking programs and medicines. These can increase your chances of quitting for good. · Eat a heart-healthy diet. · Stay at a healthy weight. Lose weight if you need to. · Limit alcohol to 2 drinks a day for men and 1 drink a day for women. Too much alcohol can cause health problems. · Avoid colds and flu. Get a pneumococcal vaccine shot.  If you have had one before, ask your doctor whether you need another dose. Get a flu shot every year. If you must be around people with colds or flu, wash your hands often. Activity  · If your doctor recommends it, get more exercise. Walking is a good choice. Bit by bit, increase the amount you walk every day. Try for at least 30 minutes on most days of the week. You also may want to swim, bike, or do other activities. Your doctor may suggest that you join a cardiac rehabilitation program so that you can have help increasing your physical activity safely. · Start light exercise if your doctor says it is okay. Even a small amount will help you get stronger, have more energy, and manage stress. Walking is an easy way to get exercise. Start out by walking a little more than you did in the hospital. Gradually increase the amount you walk. · When you exercise, watch for signs that your heart is working too hard. You are pushing too hard if you cannot talk while you are exercising. If you become short of breath or dizzy or have chest pain, sit down and rest immediately. · Check your pulse regularly. Place two fingers on the artery at the palm side of your wrist, in line with your thumb. If your heartbeat seems uneven or fast, talk to your doctor. When should you call for help? Call 911 anytime you think you may need emergency care. For example, call if:  · You have symptoms of a heart attack. These may include:  ¨ Chest pain or pressure, or a strange feeling in the chest.  ¨ Sweating. ¨ Shortness of breath. ¨ Nausea or vomiting. ¨ Pain, pressure, or a strange feeling in the back, neck, jaw, or upper belly or in one or both shoulders or arms. ¨ Lightheadedness or sudden weakness. ¨ A fast or irregular heartbeat. After you call 911, the  may tell you to chew 1 adult-strength or 2 to 4 low-dose aspirin. Wait for an ambulance. Do not try to drive yourself. · You have symptoms of a stroke.  These may include:  ¨ Sudden numbness, tingling, weakness, or loss of movement in your face, arm, or leg, especially on only one side of your body. ¨ Sudden vision changes. ¨ Sudden trouble speaking. ¨ Sudden confusion or trouble understanding simple statements. ¨ Sudden problems with walking or balance. ¨ A sudden, severe headache that is different from past headaches. · You passed out (lost consciousness). Call your doctor now or seek immediate medical care if:  · You have new or increased shortness of breath. · You feel dizzy or lightheaded, or you feel like you may faint. · Your heart rate becomes irregular. · You can feel your heart flutter in your chest or skip heartbeats. Tell your doctor if these symptoms are new or worse. Watch closely for changes in your health, and be sure to contact your doctor if you have any problems. Where can you learn more? Go to http://jose manuel-devan.info/. Enter U020 in the search box to learn more about \"Atrial Fibrillation: Care Instructions. \"  Current as of: September 21, 2016  Content Version: 11.3  © 2434-7264 [employer]. Care instructions adapted under license by [employer] (which disclaims liability or warranty for this information). If you have questions about a medical condition or this instruction, always ask your healthcare professional. [address] any warranty or liability for your use of this information.

## 2017-07-11 NOTE — PROGRESS NOTES
Cath with  on Thurs July 13 10 am.Arrive 2nd floor main hospital building at 8am.Npo after midnight. Hold Eliquis starting Tuesday July 11. Labs pending. May take other meds with sips. Pt advised.

## 2017-07-11 NOTE — PROGRESS NOTES
Subjective:     Problem List  Date Reviewed: 7/11/2017          Codes Class Noted    Chronic obstructive pulmonary disease ([facility] ICD-10-CM: J44.9  ICD-9-CM: 532  Unknown        Atrial fibrillation ([facility] ICD-10-CM: I48.91  ICD-9-CM: 427.31  6/30/2014    Overview Addendum 4/4/2015 12:47 PM by MD KEYON White.  DCCV (10/21/14): 250J. Sonia Kim (12/2014). C.  DCCV (4/3/15): On amio. Coronary artery disease ICD-10-CM: I25.10  ICD-9-CM: 414.00  4/11/2014    Overview Addendum 1/21/2015  1:52 PM by MD KEYON White.  NSTEMI (2/12/14):  B. Cath (2/12/14):  LAD m50. OM1 50. RCA m99. EF 55%. C.  PCI mRCA (2/12/14, [facility]):  3.5x26 Integrity (BMS). Familia Fender Cardiolite (6/26/14): Normal perfusion, EF 59%. E.  Echo (6/30/14):  EF 65%. Mildly dil LA. Mild AR/MR. Mild to mod AVSC. A.  Cath (1/21/15):  LAD m70; D1 ost60 (small vessel). OM1 40. RCA patent with patent prox stent. EF 60%. No AVG/MR.  B.  PCI mLAD (1/21/15):  2.75x14 Integrity (BMS). Hypertension ICD-10-CM: I10  ICD-9-CM: 401.9  4/11/2014        Dyslipidemia ICD-10-CM: E78.5  ICD-9-CM: 272.4  4/11/2014    Overview Signed 1/22/2015 12:08 PM by MD KEYON White. FLP (1/22/15): Tot 167, TG 98, HDL 9,  (no RX). Mr Shahram Mayer is a 73 y.o. man with the above past medical history, who presents for follow up of his coronary artery disease. He was in the emergency room recently where he reported elevated blood pressure, as well as some shortness of breath with activity and some chest discomfort. Over the past several weeks he has noticed these symptoms. He has been reporting chest discomfort with physical activity that he describes as \"tightness\". At times this is associated with discomfort in his left hand and left forearm. This is similar to his prior anginal symptoms. This is also associated with shortness of breath.   He was given Imdur while he was in the emergency room, however, he cannot take this because it is causing significant headaches. Also his blood pressure is at least mildly elevated. He denies any chest discomfort at rest, orthopnea, paroxysmal nocturnal dyspnea, lower extremity swelling, palpitations, syncope or near syncope. At some point he has reverted back to atrial fibrillation, which is currently rate controlled on his current dose of Metoprolol 100 mg twice per day. History   Smoking Status    Former Smoker    Quit date: 2/1/2014   Smokeless Tobacco    Current User     Comment: Smokes e cigartettes       Current Outpatient Prescriptions   Medication Sig Dispense Refill    metoprolol tartrate (LOPRESSOR) 50 mg tablet Take 50 mg by mouth four (4) times daily. Increased per pcp      ELIQUIS 5 mg tablet take 1 tablet by mouth twice a day 60 Tab 2    isosorbide mononitrate ER (IMDUR) 30 mg tablet Take 1 Tab by mouth daily. 20 Tab 0    atorvastatin (LIPITOR) 80 mg tablet take 1 tablet by mouth once daily 30 Tab 4    misoprostol (CYTOTEC) 200 mcg tablet Take 1 Tab by mouth every six (6) hours. Indications: PREVENTION OF NSAID-INDUCED GASTRIC ULCER 120 Tab 1    aspirin 81 mg chewable tablet Take 1 Tab by mouth daily. 30 Tab 3    citalopram (CELEXA) 20 mg tablet Take 40 mg by mouth daily. 0    fish oil-omega-3 fatty acids (FISH OIL) 340-1,000 mg capsule Take 1 Cap by mouth every evening.  thiamine (B-1) 100 mg tablet Take 100 mg by mouth daily. Objective:     Visit Vitals    BP (!) 150/98 (BP 1 Location: Left arm, BP Patient Position: Sitting)    Pulse 66    Resp 22    Ht 6' 3\" (1.905 m)    Wt 280 lb (127 kg)    SpO2 98%    BMI 35 kg/m2       HEENT Exam:     Normocephalic, atraumatic. EOMI. Oropharynx negative. Neck supple. No lymphadenopathy. Lung Exam:     The patient is not dyspneic. There is no cough. The lungs are clear to percussion. Breath sounds are heard equally in all lung fields.  There are no wheezes, rales, rhonchi, or rubs heard on auscultation. Heart Exam:     The rhythm is irregularly irregular. The PMI is in the 5th intercostal space of the MCL. Apical impulse is normal. S1 is regular. S2 is physiologic. There is no S3, S4 gallop, murmur, click, or rub. Abdomen Exam:     Bowel sounds are normoactive. Abdomen benign. Extremities Exam:     The extremities are atraumatic appearing. There is no clubbing, cyanosis, edema, ulcers, varicose veins, rash, swelling, erythemia noted in the extremities. The neurovascular status is grossly intact with normal distal sensation and pulses. Vascular Exam:     The radial, brachial, dorsalis pedis, posterior tibial, are equal and strong bilaterally The carotids are equal bilaterally without bruits. EK17:  AF @ 62, no isch. Assessment/Plan:     Mr. Isra Tamez has several issues. One is atrial fibrillation, which is adequately rate controlled on his current medication regimen. He is all ready taking Eliquis for anticoagulation as well. With regard to his exertional chest discomfort and shortness of breath this is worrisome for unstable angina. I have recommended cardiac catheterization with possible percutaneous coronary intervention. The risks, benefits, and alternatives of this procedure have been explained and verbal informed consent has been obtained. This will be set up for the near future. He will need to hold his  Eliquis for 48 hours prior to the procedure. With regard to his elevated blood pressure he has all ready stopped his Imdur. I am going to give him a prescription for Norvasc 5 mg per day. We discussed diet and exercise. Plan:  1. Continue outpatient medication regimen. 2. Discontinue Imdur. 3. Start Norvasc 5 mg per day. 4. Cardiac catheterization with possible percutaneous coronary intervention in the near future off Eliquis. 5. Diet and exercise.   6. Follow up with myself will be based on the aforementioned testing. 7. Call my office, call his primary care physician, or return to the hospital should any concerning symptomatology arise. Mr. Ana Lilia Jin indicated that he understood this plan and wished to proceed ahead.             Patient Care Team:  Francois Ames MD as PCP - General (Family Practice)  Man Maria MD (Gastroenterology)  Angelica Beltrán MD as Physician (Cardiology)  Carmina Kurtz MD (Pulmonary Disease)

## 2017-07-12 DIAGNOSIS — R07.9 CHEST PAIN, UNSPECIFIED TYPE: Primary | ICD-10-CM

## 2017-07-12 LAB
BUN SERPL-MCNC: 14 MG/DL (ref 8–27)
BUN/CREAT SERPL: 18 (ref 10–24)
CALCIUM SERPL-MCNC: 9.8 MG/DL (ref 8.6–10.2)
CHLORIDE SERPL-SCNC: 99 MMOL/L (ref 96–106)
CO2 SERPL-SCNC: 25 MMOL/L (ref 18–29)
CREAT SERPL-MCNC: 0.77 MG/DL (ref 0.76–1.27)
ERYTHROCYTE [DISTWIDTH] IN BLOOD BY AUTOMATED COUNT: 14.3 % (ref 12.3–15.4)
GLUCOSE SERPL-MCNC: 96 MG/DL (ref 65–99)
HCT VFR BLD AUTO: 47 % (ref 37.5–51)
HGB BLD-MCNC: 16 G/DL (ref 12.6–17.7)
MCH RBC QN AUTO: 32.4 PG (ref 26.6–33)
MCHC RBC AUTO-ENTMCNC: 34 G/DL (ref 31.5–35.7)
MCV RBC AUTO: 95 FL (ref 79–97)
PLATELET # BLD AUTO: 152 X10E3/UL (ref 150–379)
POTASSIUM SERPL-SCNC: 4.5 MMOL/L (ref 3.5–5.2)
RBC # BLD AUTO: 4.94 X10E6/UL (ref 4.14–5.8)
SODIUM SERPL-SCNC: 142 MMOL/L (ref 134–144)
WBC # BLD AUTO: 5.1 X10E3/UL (ref 3.4–10.8)

## 2017-07-12 RX ORDER — SODIUM CHLORIDE 0.9 % (FLUSH) 0.9 %
5-10 SYRINGE (ML) INJECTION EVERY 8 HOURS
Status: CANCELLED | OUTPATIENT
Start: 2017-07-13

## 2017-07-12 RX ORDER — SODIUM CHLORIDE 0.9 % (FLUSH) 0.9 %
5-10 SYRINGE (ML) INJECTION AS NEEDED
Status: CANCELLED | OUTPATIENT
Start: 2017-07-13

## 2017-07-12 NOTE — PROGRESS NOTES
10:48 AM Pt's. Chart reviewed possibly including viewing of labs, test results, imaging results and history and physical for possible cath/angio/EP procedure.

## 2017-07-13 ENCOUNTER — HOSPITAL ENCOUNTER (OUTPATIENT)
Dept: CARDIAC CATH/INVASIVE PROCEDURES | Age: 72
Discharge: HOME | End: 2017-07-13
Attending: SPECIALIST | Admitting: SPECIALIST
Payer: COMMERCIAL

## 2017-07-13 VITALS
SYSTOLIC BLOOD PRESSURE: 156 MMHG | BODY MASS INDEX: 34.18 KG/M2 | HEART RATE: 62 BPM | TEMPERATURE: 97.6 F | DIASTOLIC BLOOD PRESSURE: 90 MMHG | RESPIRATION RATE: 21 BRPM | HEIGHT: 76 IN | OXYGEN SATURATION: 94 % | WEIGHT: 280.65 LBS

## 2017-07-13 DIAGNOSIS — R07.9 CHEST PAIN, UNSPECIFIED TYPE: ICD-10-CM

## 2017-07-13 PROCEDURE — 77030029065 HC DRSG HEMO QCLOT ZMED -B

## 2017-07-13 PROCEDURE — 74011636320 HC RX REV CODE- 636/320: Performed by: SPECIALIST

## 2017-07-13 PROCEDURE — C1894 INTRO/SHEATH, NON-LASER: HCPCS

## 2017-07-13 PROCEDURE — 93458 L HRT ARTERY/VENTRICLE ANGIO: CPT

## 2017-07-13 PROCEDURE — C1760 CLOSURE DEV, VASC: HCPCS

## 2017-07-13 PROCEDURE — 77030018836 HC SOL IRR NACL ICUM -A

## 2017-07-13 PROCEDURE — 74011250636 HC RX REV CODE- 250/636: Performed by: SPECIALIST

## 2017-07-13 PROCEDURE — 77030004532 HC CATH ANGI DX IMP BSC -A

## 2017-07-13 PROCEDURE — 74011000250 HC RX REV CODE- 250: Performed by: SPECIALIST

## 2017-07-13 RX ORDER — FENTANYL CITRATE 50 UG/ML
25-200 INJECTION, SOLUTION INTRAMUSCULAR; INTRAVENOUS
Status: DISCONTINUED | OUTPATIENT
Start: 2017-07-13 | End: 2017-07-13 | Stop reason: HOSPADM

## 2017-07-13 RX ORDER — HYDROCORTISONE SODIUM SUCCINATE 100 MG/2ML
100 INJECTION, POWDER, FOR SOLUTION INTRAMUSCULAR; INTRAVENOUS
Status: DISCONTINUED | OUTPATIENT
Start: 2017-07-13 | End: 2017-07-13 | Stop reason: HOSPADM

## 2017-07-13 RX ORDER — DIPHENHYDRAMINE HYDROCHLORIDE 50 MG/ML
25 INJECTION, SOLUTION INTRAMUSCULAR; INTRAVENOUS
Status: DISCONTINUED | OUTPATIENT
Start: 2017-07-13 | End: 2017-07-13 | Stop reason: HOSPADM

## 2017-07-13 RX ORDER — SODIUM CHLORIDE 0.9 % (FLUSH) 0.9 %
5-10 SYRINGE (ML) INJECTION AS NEEDED
Status: DISCONTINUED | OUTPATIENT
Start: 2017-07-13 | End: 2017-07-13 | Stop reason: HOSPADM

## 2017-07-13 RX ORDER — SODIUM CHLORIDE 0.9 % (FLUSH) 0.9 %
5-10 SYRINGE (ML) INJECTION EVERY 8 HOURS
Status: DISCONTINUED | OUTPATIENT
Start: 2017-07-13 | End: 2017-07-13 | Stop reason: HOSPADM

## 2017-07-13 RX ORDER — AMLODIPINE BESYLATE 5 MG/1
TABLET ORAL
Status: SHIPPED | COMMUNITY
Start: 2017-07-13 | End: 2017-07-31 | Stop reason: SDUPTHER

## 2017-07-13 RX ORDER — HEPARIN SODIUM 200 [USP'U]/100ML
500 INJECTION, SOLUTION INTRAVENOUS
Status: DISCONTINUED | OUTPATIENT
Start: 2017-07-13 | End: 2017-07-13 | Stop reason: HOSPADM

## 2017-07-13 RX ORDER — MIDAZOLAM HYDROCHLORIDE 1 MG/ML
.5-1 INJECTION, SOLUTION INTRAMUSCULAR; INTRAVENOUS
Status: DISCONTINUED | OUTPATIENT
Start: 2017-07-13 | End: 2017-07-13 | Stop reason: HOSPADM

## 2017-07-13 RX ORDER — AMLODIPINE BESYLATE 5 MG/1
5 TABLET ORAL DAILY
Status: ON HOLD | COMMUNITY
End: 2017-07-13

## 2017-07-13 RX ORDER — LIDOCAINE HYDROCHLORIDE 10 MG/ML
1-20 INJECTION INFILTRATION; PERINEURAL
Status: DISCONTINUED | OUTPATIENT
Start: 2017-07-13 | End: 2017-07-13 | Stop reason: HOSPADM

## 2017-07-13 RX ORDER — SODIUM CHLORIDE 9 MG/ML
75 INJECTION, SOLUTION INTRAVENOUS CONTINUOUS
Status: DISCONTINUED | OUTPATIENT
Start: 2017-07-13 | End: 2017-07-13 | Stop reason: HOSPADM

## 2017-07-13 RX ADMIN — HEPARIN SODIUM 1000 UNITS: 200 INJECTION, SOLUTION INTRAVENOUS at 12:19

## 2017-07-13 RX ADMIN — MIDAZOLAM HYDROCHLORIDE 1 MG: 1 INJECTION, SOLUTION INTRAMUSCULAR; INTRAVENOUS at 12:23

## 2017-07-13 RX ADMIN — FENTANYL CITRATE 50 MCG: 50 INJECTION, SOLUTION INTRAMUSCULAR; INTRAVENOUS at 12:18

## 2017-07-13 RX ADMIN — FENTANYL CITRATE 25 MCG: 50 INJECTION, SOLUTION INTRAMUSCULAR; INTRAVENOUS at 12:27

## 2017-07-13 RX ADMIN — CEFAZOLIN 3 G: 1 INJECTION, POWDER, FOR SOLUTION INTRAMUSCULAR; INTRAVENOUS; PARENTERAL at 12:53

## 2017-07-13 RX ADMIN — HEPARIN SODIUM 1000 UNITS: 200 INJECTION, SOLUTION INTRAVENOUS at 12:18

## 2017-07-13 RX ADMIN — MIDAZOLAM HYDROCHLORIDE 3 MG: 1 INJECTION, SOLUTION INTRAMUSCULAR; INTRAVENOUS at 12:19

## 2017-07-13 RX ADMIN — FENTANYL CITRATE 25 MCG: 50 INJECTION, SOLUTION INTRAMUSCULAR; INTRAVENOUS at 12:23

## 2017-07-13 RX ADMIN — LIDOCAINE HYDROCHLORIDE 20 ML: 10 INJECTION, SOLUTION INFILTRATION; PERINEURAL at 12:19

## 2017-07-13 RX ADMIN — MIDAZOLAM HYDROCHLORIDE 1 MG: 1 INJECTION, SOLUTION INTRAMUSCULAR; INTRAVENOUS at 12:26

## 2017-07-13 RX ADMIN — IOPAMIDOL 120 ML: 755 INJECTION, SOLUTION INTRAVENOUS at 12:35

## 2017-07-13 RX ADMIN — SODIUM CHLORIDE 75 ML/HR: 900 INJECTION, SOLUTION INTRAVENOUS at 12:55

## 2017-07-13 NOTE — PROGRESS NOTES
TRANSFER - OUT REPORT:    Verbal report given to Gloira(name) on Wanda Toscano  being transferred to cath(unit) for routine progression of care       Report consisted of patients Situation, Background, Assessment and   Recommendations(SBAR). Information from the following report(s) SBAR was reviewed with the receiving nurse. Lines:   Peripheral IV 07/13/17 Right Forearm (Active)   Site Assessment Clean, dry, & intact 7/13/2017  8:47 AM   Phlebitis Assessment 0 7/13/2017  8:47 AM   Infiltration Assessment 0 7/13/2017  8:47 AM   Dressing Status Clean, dry, & intact 7/13/2017  8:47 AM   Dressing Type Transparent 7/13/2017  8:47 AM   Hub Color/Line Status Pink 7/13/2017  8:47 AM   Alcohol Cap Used Yes 7/13/2017  8:47 AM        Opportunity for questions and clarification was provided. Patient transported with:   Narrative   Dr. Scot Russell informed of elevated B/P when he was at bedside with pt and signed consent.

## 2017-07-13 NOTE — PROGRESS NOTES
Pt discharged via wheelchair to the care of his friend. Pt stated he will have someone to stay the night with him tonight.

## 2017-07-13 NOTE — PROCEDURES
Cath (7/13/17):  LAD patent with patent mid stent. LCx ost20; OM1 30. RCA patent with patent prox stent. EF 55%. No AVG, MR 1+. No sig CAD. Symptoms may be due to HTN. Will further uptitrate norvasc to 10mg every day. F/U with Bert Styles NP 8/3 to assess efficacy.

## 2017-07-13 NOTE — H&P
Date of Surgery Update:  Alexys Nelson was seen and examined. History and physical has been reviewed. The patient has been examined. There have been no significant clinical changes since the completion of the originally dated History and Physical.    Signed By: Barbie Panchal MD     July 13, 2017 8:42 AM         Subjective:      Problem List  Date Reviewed: 7/11/2017    Francisco New    Codes Class Noted     Chronic obstructive pulmonary disease ([facility] ICD-10-CM: J44.9  ICD-9-CM: 496   Unknown           Atrial fibrillation ([facility] ICD-10-CM: I48.91  ICD-9-CM: 427.31   6/30/2014     Overview Addendum 4/4/2015 12:47 PM by Barbie Panchal MD       A. DCCV (10/21/14): 250J. Manny Mohiin (12/2014). C.  DCCV (4/3/15): On amio.               Coronary artery disease ICD-10-CM: I25.10  ICD-9-CM: 414.00   4/11/2014     Overview Addendum 1/21/2015  1:52 PM by MD KEYON Boswell.  NSTEMI (2/12/14):  B. Cath (2/12/14):  LAD m50. OM1 50. RCA m99. EF 55%. C.  PCI mRCA (2/12/14, [facility]):  3.5x26 Integrity (BMS). Graeme Kayser Cardiolite (6/26/14): Normal perfusion, EF 59%. E.  Echo (6/30/14):  EF 65%. Mildly dil LA. Mild AR/MR. Mild to mod AVSC. A.  Cath (1/21/15):  LAD m70; D1 ost60 (small vessel). OM1 40. RCA patent with patent prox stent. EF 60%. No AVG/MR.  B.  PCI mLAD (1/21/15):  2.75x14 Integrity (BMS).             Hypertension ICD-10-CM: I10  ICD-9-CM: 239. 9   4/11/2014           Dyslipidemia ICD-10-CM: E78.5  ICD-9-CM: 272.4   4/11/2014     Overview Signed 1/22/2015 12:08 PM by MD KEYON Boswell. FLP (1/22/15): Tot 167, TG 98, HDL 9,  (no RX).                    Mr Teresa Anderson is a 73 y.o. man with the above past medical history, who presents for follow up of his coronary artery disease. He was in the emergency room recently where he reported elevated blood pressure, as well as some shortness of breath with activity and some chest discomfort.   Over the past several weeks he has noticed these symptoms. He has been reporting chest discomfort with physical activity that he describes as \"tightness\". At times this is associated with discomfort in his left hand and left forearm. This is similar to his prior anginal symptoms. This is also associated with shortness of breath. He was given Imdur while he was in the emergency room, however, he cannot take this because it is causing significant headaches. Also his blood pressure is at least mildly elevated. He denies any chest discomfort at rest, orthopnea, paroxysmal nocturnal dyspnea, lower extremity swelling, palpitations, syncope or near syncope. At some point he has reverted back to atrial fibrillation, which is currently rate controlled on his current dose of Metoprolol 100 mg twice per day.                 History   Smoking Status    Former Smoker    Quit date: 2/1/2014   Smokeless Tobacco    Current User       Comment: Smokes e cigartettes                Current Outpatient Prescriptions   Medication Sig Dispense Refill    metoprolol tartrate (LOPRESSOR) 50 mg tablet Take 50 mg by mouth four (4) times daily. Increased per pcp        ELIQUIS 5 mg tablet take 1 tablet by mouth twice a day 60 Tab 2    isosorbide mononitrate ER (IMDUR) 30 mg tablet Take 1 Tab by mouth daily. 20 Tab 0    atorvastatin (LIPITOR) 80 mg tablet take 1 tablet by mouth once daily 30 Tab 4    misoprostol (CYTOTEC) 200 mcg tablet Take 1 Tab by mouth every six (6) hours. Indications: PREVENTION OF NSAID-INDUCED GASTRIC ULCER 120 Tab 1    aspirin 81 mg chewable tablet Take 1 Tab by mouth daily.  30 Tab 3    citalopram (CELEXA) 20 mg tablet Take 40 mg by mouth daily.   0    fish oil-omega-3 fatty acids (FISH OIL) 340-1,000 mg capsule Take 1 Cap by mouth every evening.        thiamine (B-1) 100 mg tablet Take 100 mg by mouth daily.             Objective:           Visit Vitals    BP (!) 150/98 (BP 1 Location: Left arm, BP Patient Position: Sitting)    Pulse 66    Resp 22    Ht 6' 3\" (1.905 m)    Wt 280 lb (127 kg)    SpO2 98%    BMI 35 kg/m2             HEENT Exam:      Normocephalic, atraumatic. EOMI. Oropharynx negative. Neck supple. No lymphadenopathy.                 Lung Exam:      The patient is not dyspneic. There is no cough. The lungs are clear to percussion. Breath sounds are heard equally in all lung fields. There are no wheezes, rales, rhonchi, or rubs heard on auscultation.                 Heart Exam:      The rhythm is irregularly irregular. The PMI is in the 5th intercostal space of the MCL. Apical impulse is normal. S1 is regular. S2 is physiologic. There is no S3, S4 gallop, murmur, click, or rub.                 Abdomen Exam:      Bowel sounds are normoactive. Abdomen benign.                Extremities Exam:      The extremities are atraumatic appearing. There is no clubbing, cyanosis, edema, ulcers, varicose veins, rash, swelling, erythemia noted in the extremities. The neurovascular status is grossly intact with normal distal sensation and pulses.                 Vascular Exam:      The radial, brachial, dorsalis pedis, posterior tibial, are equal and strong bilaterally The carotids are equal bilaterally without bruits.                   EK17:  AF @ 62, no isch.      Assessment/Plan:      Mr. Beba Monroy has several issues. One is atrial fibrillation, which is adequately rate controlled on his current medication regimen. He is all ready taking Eliquis for anticoagulation as well.      With regard to his exertional chest discomfort and shortness of breath this is worrisome for unstable angina. I have recommended cardiac catheterization with possible percutaneous coronary intervention. The risks, benefits, and alternatives of this procedure have been explained and verbal informed consent has been obtained. This will be set up for the near future.   He will need to hold his  Eliquis for 48 hours prior to the procedure.       With regard to his elevated blood pressure he has all ready stopped his Imdur. I am going to give him a prescription for Norvasc 5 mg per day.       We discussed diet and exercise.      Plan:  1. Continue outpatient medication regimen. 2. Discontinue Imdur. 3. Start Norvasc 5 mg per day. 4. Cardiac catheterization with possible percutaneous coronary intervention in the near future off Eliquis. 5. Diet and exercise. 6. Follow up with myself will be based on the aforementioned testing. 7. Call my office, call his primary care physician, or return to the hospital should any concerning symptomatology arise.      Mr. Harika Guillory indicated that he understood this plan and wished to proceed ahead.             Patient Care Team:  Shannon Topete MD as PCP - General (Family Practice)  Kerri Kent MD (Gastroenterology)  Steffen Rossi MD as Physician (Cardiology)  Trino Go MD (Pulmonary Disease)

## 2017-07-13 NOTE — IP AVS SNAPSHOT
[address] [facility] [address] [facility] 
[phone] Patient: Jamey Mejia MRN: [id number] :1945 You are allergic to the following No active allergies Recent Documentation Height Weight BMI Smoking Status 1.918 m 127.3 kg 34.62 kg/m2 Former Smoker Emergency Contacts Name Discharge Info Relation Home Work Mobile Martha Funk  Friend [5] [phone]    
 RACHEL,Martha  Friend [5] [phone] About your hospitalization You were admitted on:  2017 You last received care in the:  [facility] PACU You were discharged on:  2017 Unit phone number:  [phone] Why you were hospitalized Your primary diagnosis was:  Not on File Providers Seen During Your Hospitalizations Provider Role Specialty Primary office phone Steffen Rossi MD Attending Provider Cardiology [phone] Your Primary Care Physician (PCP) Primary Care Physician Office Phone Office Fax Natalie Sosa [phone] Follow-up Information Follow up With Details Comments Contact Info Yobani Larson NP On 8/3/2017 11am [address] [facility] [address] [facility] 
[phone] Shannon Topete MD   [place] [address] [address] [facility] 
[phone] Your Appointments  11:00 AM EDT  
ESTABLISHED PATIENT with Yobani Larson NP  
[facility] ([place] [facility]e[place]) [address] [facility] [address] [facility]  
[phone] Current Discharge Medication List  
  
CONTINUE these medications which have CHANGED Dose & Instructions Dispensing Information Comments Morning Noon Evening Bedtime NORVASC 5 mg tablet Generic drug:  amLODIPine What changed:   
- how much to take 
- how to take this - when to take this - additional instructions Your last dose was: Your next dose is:    
   
   
 Increase to 2 tablets once per day (10mg per day). Refills:  0 CONTINUE these medications which have NOT CHANGED Dose & Instructions Dispensing Information Comments Morning Noon Evening Bedtime  
 aspirin 81 mg chewable tablet Your last dose was: Your next dose is:    
   
   
 Dose:  81 mg Take 1 Tab by mouth daily. Quantity:  30 Tab Refills:  3  
     
   
   
   
  
 atorvastatin 80 mg tablet Commonly known as:  LIPITOR Your last dose was: Your next dose is:    
   
   
 take 1 tablet by mouth once daily Quantity:  30 Tab Refills:  4  
     
   
   
   
  
 citalopram 20 mg tablet Commonly known as:  [place] Pac Your last dose was: Your next dose is:    
   
   
 Dose:  40 mg Take 40 mg by mouth daily. Refills:  0  
     
   
   
   
  
 ELIQUIS 5 mg tablet Generic drug:  apixaban Your last dose was: Your next dose is:    
   
   
 take 1 tablet by mouth twice a day Quantity:  60 Tab Refills:  2 FISH -1,000 mg capsule Generic drug:  fish oil-omega-3 fatty acids Your last dose was: Your next dose is:    
   
   
 Dose:  1 Cap Take 1 Cap by mouth every evening. Refills:  0  
     
   
   
   
  
 metoprolol tartrate 100 mg IR tablet Commonly known as:  LOPRESSOR Your last dose was: Your next dose is:    
   
   
 Dose:  100 mg Take 1 Tab by mouth two (2) times a day. Quantity:  60 Tab Refills:  3  
     
   
   
   
  
 thiamine 100 mg tablet Commonly known as:  B-1 Your last dose was: Your next dose is:    
   
   
 Dose:  100 mg Take 100 mg by mouth daily. Refills:  0 Discharge Instructions Discharge Instructions:  
 
Medications: Activity: As tolerated except do not lift over 10 pounds for 5 days. [address] July 13, 2017 RE: Leopoldo Alegre To Whom It May Concern, This is to certify that Leopoldo Alegre may may return to work on 7/15/1. Please feel free to contact my office if you have any questions or concerns. Thank you for your assistance in this matter. Sincerely, Doug Lechuga RN/Dr.Mark Jadon Arroyo [phone] Diet:  
 
American Heart Association. Follow-up:  
 
Follow up with uMlu Lange NP on August 3rd, 2017 at [address] [facility] [address] 
[phone] If you smoke, STOP! Cardiac Catheterization/Angiography Discharge Instructions *Check the puncture site frequently for swelling or bleeding. If you see any bleeding, lie down and apply pressure over the area with a clean town or washcloth. Notify your doctor for any redness, swelling, drainage or oozing from the puncture site. Notify your doctor for any fever or chills. *If the leg or arm with the puncture becomes cold, numb or painful, call Dr Jadon Arroyo at  [phone] *Activity should be limited for the next 48 hours. Climb stairs as little as possible and avoid any stooping, bending or strenuous activity for 24 hours. No heavy lifting (anything over 10 pounds) for three days. *Do not drive for 24 hours. *You may resume your usual diet. Drink more fluids than usual. 
 
*Have a responsible person drive you home and stay with you for at least 24 hours after your heart catheterization/angiography. *You may remove the bandage from your Right in 24 hours. You may shower in 24 hours. No tub baths, hot tubs or swimming for one week. Do not place any lotions, creams, powders, ointments over the puncture site for one week. You may place a clean band-aid over the puncture site each day for 5 days. Change this daily. Discharge Orders None  
  
Introducing [facility]! Davian Walter introduces [employer] patient portal. Now you can access parts of your medical record, email your doctor's office, and request medication refills online. 1. In your internet browser, go to [email or web address] 2. Click on the First Time User? Click Here link in the Sign In box. You will see the New Member Sign Up page. 3. Enter your [employer] Access Code exactly as it appears below. You will not need to use this code after youve completed the sign-up process. If you do not sign up before the expiration date, you must request a new code. · [employer] Access Code: [email or web address] Expires: 10/6/2017  9:54 AM 
 
4. Enter the last four digits of your Social Security Number (xxxx) and Date of Birth (mm/dd/yyyy) as indicated and click Submit. You will be taken to the next sign-up page. 5. Create a [employer] ID. This will be your [employer] login ID and cannot be changed, so think of one that is secure and easy to remember. 6. Create a [employer] password. You can change your password at any time. 7. Enter your Password Reset Question and Answer. This can be used at a later time if you forget your password. 8. Enter your e-mail address. You will receive e-mail notification when new information is available in [address] 9. Click Sign Up. You can now view and download portions of your medical record. 10. Click the Download Summary menu link to download a portable copy of your medical information. If you have questions, please visit the Frequently Asked Questions section of the [employer] website. Remember, [employer] is NOT to be used for urgent needs. For medical emergencies, dial 911. Now available from your iPhone and Android! General Information Please provide this summary of care documentation to your next provider. Patient Signature:  ____________________________________________________________ Date:  ____________________________________________________________  
  
Heather Pane Provider Signature:  ____________________________________________________________ Date:  ____________________________________________________________

## 2017-07-13 NOTE — DISCHARGE INSTRUCTIONS
Discharge Instructions:     Medications: Activity:     As tolerated except do not lift over 10 pounds for 5 days. [address] July 13, 2017       RE: Bhaskar Buitrago      To Whom It May Concern,    This is to certify that Bhaskar Buitrago may may return to work on 7/15/1. Please feel free to contact my office if you have any questions or concerns. Thank you for your assistance in this matter. Sincerely,  Mack Woodall RN/Dr.Mark Jojo iSmpson  [phone]            Diet:     American Heart Association. Follow-up:     Follow up with Jeferson Arroyo NP on August 3rd, 2017 at [address] [place], [place] [address]J Carlos Adame  [phone]      If you smoke, STOP! Cardiac Catheterization/Angiography Discharge Instructions    *Check the puncture site frequently for swelling or bleeding. If you see any bleeding, lie down and apply pressure over the area with a clean town or washcloth. Notify your doctor for any redness, swelling, drainage or oozing from the puncture site. Notify your doctor for any fever or chills. *If the leg or arm with the puncture becomes cold, numb or painful, call Dr Jojo Simpson at  [phone]    *Activity should be limited for the next 48 hours. Climb stairs as little as possible and avoid any stooping, bending or strenuous activity for 24 hours. No heavy lifting (anything over 10 pounds) for three days. *Do not drive for 24 hours. *You may resume your usual diet. Drink more fluids than usual.    *Have a responsible person drive you home and stay with you for at least 24 hours after your heart catheterization/angiography. *You may remove the bandage from your Right in 24 hours. You may shower in 24 hours. No tub baths, hot tubs or swimming for one week. Do not place any lotions, creams, powders, ointments over the puncture site for one week.  You may place a clean band-aid over the puncture site each day for 5 days. Change this daily.

## 2017-07-13 NOTE — PROGRESS NOTES
Pt ambulated to restroom and voided without diff. Groin checked and no bleeding or hematoma noted. Pt's friend picking him up. Discharge instructions reviewed with him and he verbalizes understanding.

## 2017-07-13 NOTE — PROGRESS NOTES
TRANSFER - IN REPORT:    Verbal report received from Gloria(name) on Ana Mai  being received from cath(unit) for routine progression of care      Report consisted of patients Situation, Background, Assessment and   Recommendations(SBAR). Information from the following report(s) Procedure Summary was reviewed with the receiving nurse. Opportunity for questions and clarification was provided. Assessment completed upon patients arrival to unit and care assumed.

## 2017-07-18 RX ORDER — ATORVASTATIN CALCIUM 80 MG/1
TABLET, FILM COATED ORAL
Qty: 30 TAB | Refills: 4 | Status: SHIPPED | OUTPATIENT
Start: 2017-07-18 | End: 2017-12-04 | Stop reason: SDUPTHER

## 2017-07-19 RX ORDER — LISINOPRIL 20 MG/1
20 TABLET ORAL DAILY
Qty: 30 TAB | Refills: 4 | Status: SHIPPED | OUTPATIENT
Start: 2017-07-19 | End: 2017-12-04 | Stop reason: SDUPTHER

## 2017-07-19 RX ORDER — LISINOPRIL 20 MG/1
TABLET ORAL DAILY
COMMUNITY
End: 2017-07-19 | Stop reason: SDUPTHER

## 2017-07-31 RX ORDER — AMLODIPINE BESYLATE 5 MG/1
TABLET ORAL
Qty: 60 TAB | Refills: 3 | Status: SHIPPED | OUTPATIENT
Start: 2017-07-31 | End: 2017-11-19 | Stop reason: SDUPTHER

## 2017-09-30 DIAGNOSIS — I48.19 PERSISTENT ATRIAL FIBRILLATION (HCC): ICD-10-CM

## 2017-10-02 RX ORDER — APIXABAN 5 MG/1
TABLET, FILM COATED ORAL
Qty: 60 TAB | Refills: 2 | Status: SHIPPED | OUTPATIENT
Start: 2017-10-02 | End: 2017-12-24 | Stop reason: SDUPTHER

## 2017-10-30 RX ORDER — METOPROLOL TARTRATE 100 MG/1
TABLET ORAL
Qty: 60 TAB | Refills: 0 | Status: SHIPPED | OUTPATIENT
Start: 2017-10-30 | End: 2018-01-09 | Stop reason: SDUPTHER

## 2017-10-30 NOTE — TELEPHONE ENCOUNTER
Refill is per verbal order of Dr. Delia Hollis.     Requested Prescriptions     Pending Prescriptions Disp Refills    metoprolol tartrate (LOPRESSOR) 100 mg IR tablet [Pharmacy Med Name: METOPROLOL TARTRATE 100 MG TAB] 60 Tab 0     Sig: take 1 tablet by mouth twice a day

## 2017-12-04 RX ORDER — LISINOPRIL 20 MG/1
TABLET ORAL
Qty: 30 TAB | Refills: 4 | Status: SHIPPED | OUTPATIENT
Start: 2017-12-04 | End: 2018-02-05 | Stop reason: SDUPTHER

## 2017-12-04 RX ORDER — ATORVASTATIN CALCIUM 80 MG/1
TABLET, FILM COATED ORAL
Qty: 30 TAB | Refills: 4 | Status: SHIPPED | OUTPATIENT
Start: 2017-12-04 | End: 2018-02-05 | Stop reason: SDUPTHER

## 2017-12-24 DIAGNOSIS — I48.19 PERSISTENT ATRIAL FIBRILLATION (HCC): ICD-10-CM

## 2017-12-24 RX ORDER — APIXABAN 5 MG/1
TABLET, FILM COATED ORAL
Qty: 60 TAB | Refills: 2 | Status: SHIPPED | OUTPATIENT
Start: 2017-12-24 | End: 2018-02-05 | Stop reason: SDUPTHER

## 2018-01-02 RX ORDER — AMLODIPINE BESYLATE 5 MG/1
TABLET ORAL
Qty: 60 TAB | Refills: 1 | Status: SHIPPED | OUTPATIENT
Start: 2018-01-02 | End: 2018-02-05 | Stop reason: SDUPTHER

## 2018-01-02 NOTE — TELEPHONE ENCOUNTER
Refill is per verbal order of Dr. Kb Corona.     Requested Prescriptions     Pending Prescriptions Disp Refills    amLODIPine (NORVASC) 5 mg tablet [Pharmacy Med Name: AMLODIPINE BESYLATE 5 MG TAB] 60 Tab 1     Sig: take 2 tablets by mouth daily

## 2018-01-09 RX ORDER — METOPROLOL TARTRATE 100 MG/1
TABLET ORAL
Qty: 60 TAB | Refills: 0 | Status: SHIPPED | OUTPATIENT
Start: 2018-01-09 | End: 2018-02-05 | Stop reason: SDUPTHER

## 2018-02-05 ENCOUNTER — OFFICE VISIT (OUTPATIENT)
Dept: CARDIOLOGY CLINIC | Age: 73
End: 2018-02-05

## 2018-02-05 VITALS
OXYGEN SATURATION: 97 % | DIASTOLIC BLOOD PRESSURE: 90 MMHG | SYSTOLIC BLOOD PRESSURE: 130 MMHG | WEIGHT: 299 LBS | RESPIRATION RATE: 16 BRPM | HEART RATE: 77 BPM | BODY MASS INDEX: 37.18 KG/M2 | HEIGHT: 75 IN

## 2018-02-05 DIAGNOSIS — E78.5 DYSLIPIDEMIA: ICD-10-CM

## 2018-02-05 DIAGNOSIS — I10 ESSENTIAL HYPERTENSION: ICD-10-CM

## 2018-02-05 DIAGNOSIS — Z79.01 CHRONIC ANTICOAGULATION: ICD-10-CM

## 2018-02-05 DIAGNOSIS — I48.19 PERSISTENT ATRIAL FIBRILLATION (HCC): ICD-10-CM

## 2018-02-05 DIAGNOSIS — I25.83 CORONARY ARTERY DISEASE DUE TO LIPID RICH PLAQUE: Primary | ICD-10-CM

## 2018-02-05 DIAGNOSIS — I25.10 CORONARY ARTERY DISEASE DUE TO LIPID RICH PLAQUE: Primary | ICD-10-CM

## 2018-02-05 RX ORDER — LISINOPRIL 20 MG/1
TABLET ORAL
Qty: 30 TAB | Refills: 4 | Status: SHIPPED | OUTPATIENT
Start: 2018-02-05 | End: 2018-09-10 | Stop reason: SDUPTHER

## 2018-02-05 RX ORDER — ATORVASTATIN CALCIUM 80 MG/1
TABLET, FILM COATED ORAL
Qty: 30 TAB | Refills: 4 | Status: SHIPPED | OUTPATIENT
Start: 2018-02-05 | End: 2018-09-10 | Stop reason: SDUPTHER

## 2018-02-05 RX ORDER — AMLODIPINE BESYLATE 5 MG/1
TABLET ORAL
Qty: 60 TAB | Refills: 4 | Status: SHIPPED | OUTPATIENT
Start: 2018-02-05 | End: 2018-07-16 | Stop reason: SDUPTHER

## 2018-02-05 RX ORDER — METOPROLOL TARTRATE 100 MG/1
TABLET ORAL
Qty: 60 TAB | Refills: 4 | Status: SHIPPED | OUTPATIENT
Start: 2018-02-05 | End: 2018-06-26 | Stop reason: SDUPTHER

## 2018-02-05 NOTE — PROGRESS NOTES
Chief Complaint   Patient presents with    Coronary Artery Disease     1. Have you been to the ER, urgent care clinic since your last visit? Hospitalized since your last visit? No    2. Have you seen or consulted any other health care providers outside of the [address] [place] [address] since your last visit? Include any pap smears or colon screening.  No

## 2018-02-05 NOTE — PROGRESS NOTES
Miguel Ángel Downing MD    [id number] [facility] [place], [address] [facility]    Office [phone],JLW [phone]  Pager [phone]    David Ash is a 81 y.o. male is here for f/u visit. Primary care physician:  Jensen Crystal MD    Patient Active Problem List   Diagnosis Code    Coronary artery disease I25.10    Hypertension I10    Dyslipidemia E78.5    Atrial fibrillation ([facility] [address] I48.91    Chronic obstructive pulmonary disease [facility]) J44.9       Dear Dr. Ryan Brice,    I had the pleasure of seeing Mr. David Ash in the office today. Chief complaint:  Chief Complaint   Patient presents with    Coronary Artery Disease       Assessment:    CAD-status post PCI previously. Last cardiac catheterization 7/2017-stents patent/normal EF  Proximal atrial fibrillation-has been cardioverted twice and has been on antiarrhythmics (amiodarone). However, patient continues to be in and out of A. fib. No longer on amiodarone today in A. fib. Rate controlled. Hypertension  Obesity  Hyperlipidemia  COPD  Chronic anticoagulation      Plan:     Patient states that his blood pressures have been controlled. Compliant with medications. Usually home blood pressures are lower than what is documented here. Continue cardiac medications. Patient requests refills which will be done today. Advised to lose weight. Patient has gained 20 pounds since his last visit. Will need sleep study to evaluate for NICOL if not done in the last 2-3 years. Lipids per PCP. Target LDL less than 70. Aggressive cardiac vascular risk factor modification. Follow-up in 3 months or earlier as needed. Patient understands the plan. All questions were answered to the patient's satisfaction.     Medication Side Effects and Warnings were discussed with patient: yes  Patient Labs were reviewed and or requested:  yes  Patient Past Records were reviewed and or requested: yes    I appreciate the opportunity to be involved in stephen. See note below for details. Please do not hesitate to contact us with questions or concerns. Chon Urias MD    Cardiac Testing/ Procedures: A. Cardiac Cath/PCI:Cath (7/13/17):  LAD patent with patent mid stent. LCx ost20; OM1 30. RCA patent with patent prox stent. EF 55%. No AVG, MR 1+. B.ECHO/GENESIS:    C.StressNuclear/Stress ECHO/Stress test:    D.Vascular:    E. EP:    F. Miscellaneous:  A.  NSTEMI (2/12/14):  B. Cath (2/12/14):  LAD m50. OM1 50. RCA m99. EF 55%. C.  PCI mRCA (2/12/14, Arlen Sanchez):  3.5x26 Integrity (BMS). [place] Cardiol[facility] (6/26/14): Normal perfusion, EF 59%. E.  Echo (6/30/14):  EF 65%. Mildly dil LA. Mild AR/MR. Mild to mod AVSC. A.  Cath (1/21/15):  LAD m70; D1 ost60 (small vessel). OM1 40. RCA patent with patent prox stent. EF 60%. No AVG/MR.  B.  PCI mLAD (1/21/15):  2.75x14 Integrity (BMS). DCCV (10/21/14): 250J. Despina Buerger (12/2014). C.  DCCV (4/3/15): On amio    Subjective:  Yordy Gayle is a 81 y.o. male who returns for follow up  today. Denies chest pain. Chronic dyspnea on exertion. Mild swelling lower extremity's. States that his blood pressure is controlled when he checks it at home. Compliant with medications. Gaining weight. ROS:  (bold if positive, if negative)             Medications before admission:    Current Outpatient Prescriptions   Medication Sig Dispense    metoprolol tartrate (LOPRESSOR) 100 mg IR tablet take 1 tablet by mouth twice a day 60 Tab    amLODIPine (NORVASC) 5 mg tablet take 2 tablets by mouth daily 60 Tab    ELIQUIS 5 mg tablet take 1 tablet by mouth twice a day 60 Tab    atorvastatin (LIPITOR) 80 mg tablet take 1 tablet by mouth once daily 30 Tab    lisinopril (PRINIVIL, ZESTRIL) 20 mg tablet take 1 tablet by mouth once daily 30 Tab    aspirin 81 mg chewable tablet Take 1 Tab by mouth daily.  30 Tab    fish oil-omega-3 fatty acids (FISH OIL) 340-1,000 mg capsule Take 1 Cap by mouth every evening.  thiamine (B-1) 100 mg tablet Take 100 mg by mouth daily.  citalopram (CELEXA) 20 mg tablet Take 40 mg by mouth daily. No current facility-administered medications for this visit.         Family History of CAD:    No    Social History:  Current  Smoker No    Physical Exam:  Visit Vitals    /90 (BP 1 Location: Left arm, BP Patient Position: Sitting)    Pulse 77    Resp 16    Ht 6' 3\" (1.905 m)    Wt 299 lb (135.6 kg)    SpO2 97%    BMI 37.37 kg/m2          Gen: Well-developed, well-nourished, in no acute distress,obese  Neck: Supple,No JVD, No Carotid Bruit,   Resp: No accessory muscle use,Dec AE bilat, No rales or rhonchi  Card: Irregular Rate,Rythm,Normal S1, S2, No murmurs  Abd:  Soft, non-tender, non-distended,BS+, obese  MSK: No cyanosis  Skin: No rashes    Neuro: moving all four extremities  Psych:  Good insight, oriented to person, place , alert, Nml Affect  LE: Trace edema    EKG:       LABS:        Lab Results   Component Value Date/Time    WBC 5.1 07/11/2017 01:06 PM    HGB 16.0 07/11/2017 01:06 PM    HCT 47.0 07/11/2017 01:06 PM    PLATELET 859 96/85/1291 01:06 PM     Lab Results   Component Value Date/Time    Sodium 142 07/11/2017 01:06 PM    Potassium 4.5 07/11/2017 01:06 PM    Chloride 99 07/11/2017 01:06 PM    CO2 25 07/11/2017 01:06 PM    Anion gap 7 07/08/2017 07:49 AM    Glucose 96 07/11/2017 01:06 PM    BUN 14 07/11/2017 01:06 PM    Creatinine 0.77 07/11/2017 01:06 PM    BUN/Creatinine ratio 18 07/11/2017 01:06 PM    GFR est  07/11/2017 01:06 PM    GFR est non-AA 93 07/11/2017 01:06 PM    Calcium 9.8 07/11/2017 01:06 PM       Lab Results   Component Value Date/Time    aPTT 27.8 07/08/2017 08:20 AM     Lab Results   Component Value Date/Time    INR 1.1 07/08/2017 08:20 AM    INR 1.0 09/21/2015 02:35 AM    INR 1.1 09/20/2015 11:55 AM    INR POC 1.4 04/04/2016 04:33 PM    INR POC 4.4 04/01/2016 10:26 AM    INR POC 5.0 03/30/2016 03:38 PM Prothrombin time 11.5 07/08/2017 08:20 AM    Prothrombin time 10.3 09/21/2015 02:35 AM    Prothrombin time 10.6 09/20/2015 11:55 AM     No components found for: Charly Cerna MD

## 2018-02-05 NOTE — MR AVS SNAPSHOT
[address] [facility] [address] [facility] 
[phone] Patient: Owen Osorio MRN: [id number] :1945 Visit Information Date & Time Provider Department Dept. Phone Encounter #  
 2018  9:20 AM Dorrie Cowden, MD [facility] Magi Solis [phone] [id number] Upcoming Health Maintenance Date Due Hepatitis C Screening 1945 FOBT Q 1 YEAR AGE 50-75 1997 ZOSTER VACCINE AGE 60> 10/7/2007 GLAUCOMA SCREENING Q2Y 2012 Pneumococcal 65+ Low/Medium Risk (1 of 2 - PCV13) 2012 MEDICARE YEARLY EXAM 2012 Influenza Age 5 to Adult 2017 DTaP/Tdap/Td series (2 - Td) 2025 Allergies as of 2018  Review Complete On: 2018 By: Joelle Clarke LPN No Known Allergies Current Immunizations  Reviewed on 2015 Name Date Tdap 2015  9:58 PM  
  
 Not reviewed this visit You Were Diagnosed With   
  
 Codes Comments Coronary artery disease due to lipid rich plaque    -  Primary ICD-10-CM: I25.10, I25.83 ICD-9-CM: 414.00, 414.3 Essential hypertension     ICD-10-CM: I10 
ICD-9-CM: 401.9 Dyslipidemia     ICD-10-CM: E78.5 ICD-9-CM: 272.4 Vitals BP Pulse Resp Height(growth percentile) Weight(growth percentile) SpO2  
 130/90 (BP 1 Location: Left arm, BP Patient Position: Sitting) 77 16 6' 3\" (1.905 m) 299 lb (135.6 kg) 97% BMI Smoking Status 37.37 kg/m2 Former Smoker Vitals History BMI and BSA Data Body Mass Index Body Surface Area  
 37.37 kg/m 2 2.68 m 2 Preferred Pharmacy Pharmacy Name Phone [facility] [address] [phone] Your Updated Medication List  
  
   
This list is accurate as of: 18  9:47 AM.  Always use your most recent med list. amLODIPine 5 mg tablet Commonly known as:  Claudell Masse take 2 tablets by mouth daily  
  
 aspirin 81 mg chewable tablet Take 1 Tab by mouth daily. atorvastatin 80 mg tablet Commonly known as:  LIPITOR  
take 1 tablet by mouth once daily  
  
 citalopram 20 mg tablet Commonly known as:  Earna Jameel Take 40 mg by mouth daily. ELIQUIS 5 mg tablet Generic drug:  apixaban  
take 1 tablet by mouth twice a day FISH -1,000 mg capsule Generic drug:  fish oil-omega-3 fatty acids Take 1 Cap by mouth every evening. lisinopril 20 mg tablet Commonly known as:  PRINIVIL, ZESTRIL  
take 1 tablet by mouth once daily  
  
 metoprolol tartrate 100 mg IR tablet Commonly known as:  LOPRESSOR  
take 1 tablet by mouth twice a day  
  
 thiamine 100 mg tablet Commonly known as:  B-1 Take 100 mg by mouth daily. Introducing [facility]! Tyrell West introduces [employer] patient portal. Now you can access parts of your medical record, email your doctor's office, and request medication refills online. 1. In your internet browser, go to [email or web address] 2. Click on the First Time User? Click Here link in the Sign In box. You will see the New Member Sign Up page. 3. Enter your KidBook Access Code exactly as it appears below. You will not need to use this code after youve completed the sign-up process. If you do not sign up before the expiration date, you must request a new code. · KidBook Access Code: [email or web address] Expires: 5/6/2018  9:27 AM 
 
4. Enter the last four digits of your Social Security Number (xxxx) and Date of Birth (mm/dd/yyyy) as indicated and click Submit. You will be taken to the next sign-up page. 5. Create a KidBook ID. This will be your KidBook login ID and cannot be changed, so think of one that is secure and easy to remember. 6. Create a KidBook password. You can change your password at any time. 7. Enter your Password Reset Question and Answer.  This can be used at a later time if you forget your password. 8. Enter your e-mail address. You will receive e-mail notification when new information is available in [address] 9. Click Sign Up. You can now view and download portions of your medical record. 10. Click the Download Summary menu link to download a portable copy of your medical information. If you have questions, please visit the Frequently Asked Questions section of the [employer] website. Remember, [employer] is NOT to be used for urgent needs. For medical emergencies, dial 911. Now available from your iPhone and Android! Please provide this summary of care documentation to your next provider. Your primary care clinician is listed as Ana Zhu. If you have any questions after today's visit, please call [phone].

## 2018-06-29 RX ORDER — METOPROLOL TARTRATE 100 MG/1
TABLET ORAL
Qty: 60 TAB | Refills: 4 | Status: SHIPPED | OUTPATIENT
Start: 2018-06-29 | End: 2018-11-25 | Stop reason: SDUPTHER

## 2018-07-18 RX ORDER — AMLODIPINE BESYLATE 5 MG/1
TABLET ORAL
Qty: 60 TAB | Refills: 4 | Status: SHIPPED | OUTPATIENT
Start: 2018-07-18 | End: 2018-12-02 | Stop reason: SDUPTHER

## 2018-08-12 DIAGNOSIS — I48.19 PERSISTENT ATRIAL FIBRILLATION (HCC): ICD-10-CM

## 2018-08-23 RX ORDER — APIXABAN 5 MG/1
TABLET, FILM COATED ORAL
Qty: 60 TAB | Refills: 0 | Status: SHIPPED | OUTPATIENT
Start: 2018-08-23 | End: 2018-10-10 | Stop reason: SDUPTHER

## 2018-08-23 NOTE — TELEPHONE ENCOUNTER
Refill of Eliquis 5 mg completed per VO of Dr. Ginny Garcia.     Last OV 2/5/18    Requested Prescriptions     Pending Prescriptions Disp Refills    ELIQUIS 5 mg tablet [Pharmacy Med Name: ELIQUIS 5 MG TABLET] 60 Tab 0     Sig: take 1 tablet by mouth twice a day

## 2018-09-10 RX ORDER — ATORVASTATIN CALCIUM 80 MG/1
TABLET, FILM COATED ORAL
Qty: 30 TAB | Refills: 4 | Status: SHIPPED | OUTPATIENT
Start: 2018-09-10 | End: 2019-01-27 | Stop reason: SDUPTHER

## 2018-09-10 RX ORDER — LISINOPRIL 20 MG/1
TABLET ORAL
Qty: 30 TAB | Refills: 4 | Status: SHIPPED | OUTPATIENT
Start: 2018-09-10 | End: 2018-11-30 | Stop reason: SDUPTHER

## 2018-11-26 RX ORDER — METOPROLOL TARTRATE 100 MG/1
TABLET ORAL
Qty: 60 TAB | Refills: 0 | Status: SHIPPED | OUTPATIENT
Start: 2018-11-26

## 2018-11-26 NOTE — TELEPHONE ENCOUNTER
Last OV 2/5/18    Refill per VO Dr. Kimberly Quijano.     Requested Prescriptions     Pending Prescriptions Disp Refills    metoprolol tartrate (LOPRESSOR) 100 mg IR tablet [Pharmacy Med Name: METOPROLOL TARTRATE 100 MG TAB] 60 Tab 0     Sig: take 1 tablet by mouth twice a day

## 2018-12-03 RX ORDER — AMLODIPINE BESYLATE 5 MG/1
TABLET ORAL
Qty: 60 TAB | Refills: 0 | Status: SHIPPED | OUTPATIENT
Start: 2018-12-03

## 2019-01-28 RX ORDER — ATORVASTATIN CALCIUM 80 MG/1
TABLET, FILM COATED ORAL
Qty: 30 TAB | Refills: 0 | Status: SHIPPED | OUTPATIENT
Start: 2019-01-28 | End: 2019-04-29 | Stop reason: SDUPTHER

## 2019-04-29 RX ORDER — ATORVASTATIN CALCIUM 80 MG/1
TABLET, FILM COATED ORAL
Qty: 30 TAB | Refills: 0 | Status: SHIPPED | OUTPATIENT
Start: 2019-04-29